# Patient Record
Sex: MALE | Race: WHITE | HISPANIC OR LATINO | Employment: PART TIME | URBAN - METROPOLITAN AREA
[De-identification: names, ages, dates, MRNs, and addresses within clinical notes are randomized per-mention and may not be internally consistent; named-entity substitution may affect disease eponyms.]

---

## 2019-11-13 ENCOUNTER — OFFICE VISIT (OUTPATIENT)
Dept: FAMILY MEDICINE | Facility: CLINIC | Age: 54
End: 2019-11-13
Payer: COMMERCIAL

## 2019-11-13 VITALS
HEIGHT: 67 IN | RESPIRATION RATE: 18 BRPM | SYSTOLIC BLOOD PRESSURE: 126 MMHG | TEMPERATURE: 98.6 F | OXYGEN SATURATION: 97 % | WEIGHT: 170 LBS | BODY MASS INDEX: 26.68 KG/M2 | DIASTOLIC BLOOD PRESSURE: 68 MMHG | HEART RATE: 57 BPM

## 2019-11-13 DIAGNOSIS — I10 BENIGN ESSENTIAL HYPERTENSION: ICD-10-CM

## 2019-11-13 DIAGNOSIS — R73.03 PRE-DIABETES: ICD-10-CM

## 2019-11-13 DIAGNOSIS — I63.9 ACUTE ISCHEMIC STROKE (H): Primary | ICD-10-CM

## 2019-11-13 DIAGNOSIS — E78.5 HYPERLIPIDEMIA LDL GOAL <100: ICD-10-CM

## 2019-11-13 DIAGNOSIS — F43.21 SITUATIONAL DEPRESSION: ICD-10-CM

## 2019-11-13 LAB
ANION GAP SERPL CALCULATED.3IONS-SCNC: 3 MMOL/L (ref 3–14)
BUN SERPL-MCNC: 14 MG/DL (ref 7–30)
CALCIUM SERPL-MCNC: 9.1 MG/DL (ref 8.5–10.1)
CHLORIDE SERPL-SCNC: 105 MMOL/L (ref 94–109)
CO2 SERPL-SCNC: 30 MMOL/L (ref 20–32)
CREAT SERPL-MCNC: 0.77 MG/DL (ref 0.66–1.25)
GFR SERPL CREATININE-BSD FRML MDRD: >90 ML/MIN/{1.73_M2}
GLUCOSE SERPL-MCNC: 117 MG/DL (ref 70–99)
POTASSIUM SERPL-SCNC: 3.8 MMOL/L (ref 3.4–5.3)
SODIUM SERPL-SCNC: 138 MMOL/L (ref 133–144)

## 2019-11-13 PROCEDURE — 80048 BASIC METABOLIC PNL TOTAL CA: CPT | Performed by: FAMILY MEDICINE

## 2019-11-13 PROCEDURE — 99203 OFFICE O/P NEW LOW 30 MIN: CPT | Performed by: FAMILY MEDICINE

## 2019-11-13 PROCEDURE — 36415 COLL VENOUS BLD VENIPUNCTURE: CPT | Performed by: FAMILY MEDICINE

## 2019-11-13 RX ORDER — ASPIRIN 81 MG/1
81 TABLET ORAL
COMMUNITY
Start: 2019-11-08 | End: 2019-12-02

## 2019-11-13 RX ORDER — ATORVASTATIN CALCIUM 40 MG/1
40 TABLET, FILM COATED ORAL
COMMUNITY
Start: 2019-11-08 | End: 2019-12-05

## 2019-11-13 RX ORDER — LISINOPRIL 2.5 MG/1
2.5 TABLET ORAL
COMMUNITY
Start: 2019-11-09 | End: 2019-12-02

## 2019-11-13 RX ORDER — CITALOPRAM HYDROBROMIDE 10 MG/1
10 TABLET ORAL
COMMUNITY
Start: 2019-11-08 | End: 2019-12-02

## 2019-11-13 RX ORDER — CLOPIDOGREL BISULFATE 75 MG/1
75 TABLET ORAL
COMMUNITY
Start: 2019-11-08 | End: 2019-12-05

## 2019-11-13 RX ORDER — AMOXICILLIN 250 MG
1-2 CAPSULE ORAL
COMMUNITY
Start: 2019-11-08 | End: 2023-07-18

## 2019-11-13 RX ORDER — ACETAMINOPHEN 325 MG/1
325-650 TABLET ORAL
COMMUNITY
Start: 2019-11-08 | End: 2023-07-18

## 2019-11-13 ASSESSMENT — ANXIETY QUESTIONNAIRES
3. WORRYING TOO MUCH ABOUT DIFFERENT THINGS: NOT AT ALL
5. BEING SO RESTLESS THAT IT IS HARD TO SIT STILL: NOT AT ALL
IF YOU CHECKED OFF ANY PROBLEMS ON THIS QUESTIONNAIRE, HOW DIFFICULT HAVE THESE PROBLEMS MADE IT FOR YOU TO DO YOUR WORK, TAKE CARE OF THINGS AT HOME, OR GET ALONG WITH OTHER PEOPLE: NOT DIFFICULT AT ALL
GAD7 TOTAL SCORE: 0
2. NOT BEING ABLE TO STOP OR CONTROL WORRYING: NOT AT ALL
6. BECOMING EASILY ANNOYED OR IRRITABLE: NOT AT ALL
1. FEELING NERVOUS, ANXIOUS, OR ON EDGE: NOT AT ALL
7. FEELING AFRAID AS IF SOMETHING AWFUL MIGHT HAPPEN: NOT AT ALL

## 2019-11-13 ASSESSMENT — PATIENT HEALTH QUESTIONNAIRE - PHQ9
SUM OF ALL RESPONSES TO PHQ QUESTIONS 1-9: 0
5. POOR APPETITE OR OVEREATING: NOT AT ALL

## 2019-11-13 ASSESSMENT — MIFFLIN-ST. JEOR: SCORE: 1551.8

## 2019-11-13 NOTE — PROGRESS NOTES
Subjective    Rolando Granados is a 54 year old male who presents to clinic with his daughter today for the following health issues:     translated the appointment.   New Patient/Transfer of Care from [facility]   Hospital Follow-up Visit:    Hospital/Nursing Home/ Rehab Facility: [facility]  Date of Admission: 10/30/19  Date of Discharge: 11/1/19  Reason(s) for Admission: Acute ischemic stroke            Problems taking medications regularly:  None       Medication changes since discharge:     Hospital Discharge Note  START taking these medicines - Medication regimen changes: started on ASA, Plavix, Lipitor for ischemic stroke. Celexa for situational depression.    acetaminophen 325 mg tablet  For diagnoses: Pain  Commonly known as: TYLENOL  Take 2 tablets by mouth every 4 hours if needed (for mild pain or temperature greater than 99.5 F (37.5 C)). Max acetaminophen dose: 4000mg in 24 hrs.    aspirin 81 mg enteric coated tablet  For diagnoses: Acute ischemic stroke (HC)  Commonly known as: ECOTRIN  Take 1 tablet by mouth once daily with a meal.    atorvastatin 40 mg tablet  For diagnoses: Acute ischemic stroke (HC)  Commonly known as: LIPITOR  Take 1 tablet by mouth at bedtime.    citalopram 10 mg tablet  For diagnoses: Situational depression  Start taking on: November 2, 2019  Commonly known as: CELEXA  Take 1 tablet by mouth every morning.    clopidogrel 75 mg tablet  For diagnoses: Acute ischemic stroke (HC)  Commonly known as: PLAVIX  Take 1 tablet by mouth every morning.      STOP taking these medicines - Patient says he is not aware of this   ibuprofen 200 mg tablet  Commonly known as: ADVIL; MOTRIN            Problems adhering to non-medication therapy:  No problems     Summary of hospitalization:  CareEverywhere information obtained and reviewed  Diagnostic Tests/Treatments reviewed.  Follow up needed: 2- 3 months recheck, fasting labs  Other Healthcare  "Providers Involved in Patient s Care:         Physical Therapy  Update since discharge: improved.     Post Discharge Medication Reconciliation: discharge medications reconciled, continue medications without change.  Plan of care communicated with patient, family and       Coding guidelines for this visit:  Type of Medical   Decision Making Face-to-Face Visit       within 7 Days of discharge Face-to-Face Visit        within 14 days of discharge   Moderate Complexity [id number]   High Complexity [id number]          Patient is new to our office. He is here with a  and his daughter. He reports he was previously healthy until this hospitalization.     -Pt had a stroke on 10/30/19. At about 10 am that day he felt pain in his left fingers, around 1:30 pm he felt pain in his legs and it was difficult to walk. He started to experience weakness in his left leg and had blurry/double vision. He went to the ER and was admitted with left hemiglegia, dysconjugate gaze, dysarthria and HTN. He received tpa on 10/30/19 for small acute infarction within the parasagittal right hemipons. He was d/c'd to rehab on 11/1 and then d/c'd on 11/9/19 with outpt PHYSICAL THERAPY, OT.     Since discharge he reports he is tolerating the medications well, feels better every day. Appetite is back to normal.     -He continues to experience some weakness in his left leg, but it has improved. He has appointments with physical therapy already scheduled. Hospital staff told him to avoid going up and down stairs so he has moved in with his daughter living in [place] because he lives on the 3rd floor of his apartment building.     -Has a little trouble with peripheral vision in his left eye. Looking to the left causes his eye to feel a little \"stretched\". Diplopia has improved.     Mental Health:  -When he was in the hospital his mental health was down. As his symptoms improve he feels a little better, but still feels " down as he is limited in some of his work due to his left leg weakness.     -Pt has follow-up appointments scheduled with neurology and neurophthalmology.   -Is interested in a nutritionist referral once he becomes more independent, currently his daughter has to take him to all his appointments.   -Pt has quit smoking since his stroke happened. He was smoking about 5-6 cigarettes per day for about 20 years.     -Used to go to [facility], was told he has high cholesterol.   -Denies constipation, diarrhea, breathing issues, cp or pressure    10/30/19 Hospital Admission  [facility]   ANW HOSPITALIST SERVICE  DISCHARGE SUMMARY    Date of service: 2019   _____________________________________________      PATIENT NAME: Rolando Granados   AGE/SEX: 53 y.o. male   : 1965   MRN: [id number]   -----------------------------------  PRIMARY CARE PROVIDER: No Pcp   PCP PHONE: None  -----------------------------------  ADMISSION DATE: 10/30/2019  DISCHARGE DATE: 2019     Dear No Pcp    It was a pleasure taking care of Rolando Granados during this hospitalization. He will be discharged from [facility] to [facility].     PRINCIPAL DIAGNOSIS CAUSING ADMISSION     Acute ischemic stroke     PATIENT'S ACTIVE HOSPITAL PROBLEM LIST   Principal Problem:  Acute ischemic stroke (HC)  Active Problems:  Aphasia due to acute stroke (HC)  Left hemiplegia (HC)  Dysconjugate gaze  HTN (hypertension)  Situational depression  [facility] diagnosis specificity reference link    BRIEF HOSPITAL COURSE   Rolando Granados is a 53 y.o. male with a history of hypertension and hyperlipidemia who was admitted on 10/30/2019 after presenting to the ED for evaluation of a sudden onset of a headache, dizziness,  left arm numbness/weakness, unsteady gait and left eye vision change.     In the ED, hypertensive up to 212/128, otherwise VSS. Labs unremarkable. Stat head CT was without acute findings.  Stroke neurologist (Dr. Benitez) agreed with initiating nicardipine for his elevated blood pressure and then tPA was administered. Admitted to [facility] ICU for further evaluation and treatment.      CTA head/neck/carotid showed no acute abnormalities. Head/Brain MRI showed a small acute infarction within the parasagittal right hemipons. TTE showed EF of 67% and borderline LV wall thickness.      PM&R consulted. SLP consulted and recommended regular, thin liquid diet. Stable to transfer to neuro floor on 10/31 with IM assuming cares. Started on atorvastatin, ASA, and Plavix on 11/1. Patient accepted at [facility] and to be discharged there on 11/1.    Patient reports feeling very down, agreed with SSRI, started on Celexa 10 mg    Patient will be discharged to [facility] on 11/1/2019 with instructions to follow up with their PCP in 3-5 days. During this hospitalization, patient was started on acetaminophen 650 mg Q4H PRN, aspirin 81 mg QD, atorvastatin 40 mg QHS, citalopram 10 mg QAM, and Plavix 75 mg QAM. His ibuprofen was discontinued. IM will continue following at [facility]    COMPLICATIONS DURING HOSPITALIZATION     None    CONSULTATIONS REQUESTED DURING THIS ADMISSION     Neurology - Reji Benitez MD  PM&R - Tawanna Kaye,     PERTINENT IMAGING RESULTS   TTE 10/31/19  1. Normal left ventricular size, borderline wall thickness, normal global systolic function, calculated EF of 67 %.   2. Normal cardiac valves.     MR Head/Brain 10/30/19  1. Small acute infarction within the parasagittal right hemipons.     CTA Head/Neck/Carotid 10/30/19  1. No large vessel occlusion. No acute intracranial abnormality at CT/CTA. No carotid or vertebral artery stenosis or dissection.  2. Intracranial atherosclerotic disease with a focal moderate stenosis of the right M1 segment.      CT Head Stroke Protocol 10/30/19  Normal noncontrast CT of the head for the patient`s age.  Nothing seen to correlate with history of headache.     EKG 10/30/2019  HR: 52  "bpm  Sinus bradycardia  Otherwise normal ECG  No previous ECGs available    PROCEDURES PERFORMED DURING THIS ADMISSION     None    PERTINENT FINDINGS / LABS AT DISCHARGE     /85  Pulse 67  Temp 98.7  F (37.1  C)  Resp 16  Ht 1.676 m (5' 6\")  Wt 77.1 kg (170 lb)  SpO2 97%  BMI 27.44 kg/m      ADDITIONAL VITAL SIGNS: tired, sleeping but wakes up to verbal stimuli; alert and oriented#3  CHEST WALL: Normal   RESPIRATORY: clear to auscultation  CARDIOVASCULAR: S1 S2 regular, no additional sounds or murmurs. No pedal edema. Good peripheral pulses.   GASTROINTESTINAL: Soft, non-tender, active bowel sounds   MUSCULOSKELETAL: No pedal edema.   SKIN/HAIR/NAILS: No rash   NEUROLOGIC: A&Ox3, mild left facial droop and left hemiparesis, left eye outward deviation-improved.   PSYCHIATRIC: Appropriate affect      Recent Labs   10/31/19  0452 10/30/19  1748   WBC 6.6 7.0   RBC 4.68 5.02   HGB 14.4 15.6   HCT 42.6 45.1   MCV 91 90   MCH 30.8 31.1   MCHC 33.8 34.6    280   MPV 9.9 10.4     Recent Labs   10/30/19  1749   SODIUM 141   POTASSIUM 4.0   CHLORIDE 106   IZ4YVMZO 26   ANIONGAP 9   BUN 16   CREATININE 1.10   GLUCOSE 113 H   CALCIUM 9.7   GFRAFRICAN >60   GFRNOTAFRICA >60     Recent Labs   10/31/19  0452 10/30/19  1749 10/30/19  1748   INR 1.0 1.0 --    -- 280   PTT 27 -- --     Recent Labs   10/31/19  0452   CHOL 213 H   TRIGLYCERIDE 92   HDL 54   CHOLHDLRATIO 3.94   LDLCHOL 141 H     Recent Labs   10/31/19  1552   PCPQUAL Not Detected   BENZODIAZQUL Not Detected   COCAINEQUAL Not Detected   THCQUAL Not Detected   OPITESQUAL Not Detected   BARBITURQUAL Not Detected   TRICYCQUAL Not Detected   METHADONEQLT Not Detected     Recent Labs   11/01/19  0854 10/31/19  2053 10/31/19  1715 10/31/19  1121 10/31/19  0451 10/30/19  2255 10/30/19  1738   GLUCOSEMETER 92 93 154 H 94 80 97 102 H     IMPORTANT PENDING TEST RESULTS     None    DISCHARGE MEDICATIONS / ORDERS       Your Home Medicines     START taking " these medicines   Instructions   acetaminophen 325 mg tablet  For diagnoses: Pain  Commonly known as: TYLENOL  Take 2 tablets by mouth every 4 hours if needed (for mild pain or temperature greater than 99.5 F (37.5 C)). Max acetaminophen dose: 4000mg in 24 hrs.    aspirin 81 mg enteric coated tablet  For diagnoses: Acute ischemic stroke (HC)  Commonly known as: ECOTRIN  Take 1 tablet by mouth once daily with a meal.    atorvastatin 40 mg tablet  For diagnoses: Acute ischemic stroke (HC)  Commonly known as: LIPITOR  Take 1 tablet by mouth at bedtime.    citalopram 10 mg tablet  For diagnoses: Situational depression  Start taking on: November 2, 2019  Commonly known as: CELEXA  Take 1 tablet by mouth every morning.    clopidogrel 75 mg tablet  For diagnoses: Acute ischemic stroke (HC)  Commonly known as: PLAVIX  Take 1 tablet by mouth every morning.      STOP taking these medicines   ibuprofen 200 mg tablet  Commonly known as: ADVIL; MOTRIN           Patient Active Problem List   Diagnosis     Acute ischemic stroke (H)     History reviewed. No pertinent surgical history.    Social History     Tobacco Use     Smoking status: Former Smoker     Smokeless tobacco: Never Used     Tobacco comment: 5-6 cig per day for 20 years. quit 2019.    Substance Use Topics     Alcohol use: Not Currently     Family History   Problem Relation Age of Onset     Other - See Comments Sister         pre-diabetes             Reviewed and updated as needed this visit by Provider         Review of Systems   ROS COMP: Constitutional, HEENT, cardiovascular, pulmonary, gi and gu systems are negative, except as otherwise noted.    This document serves as a record of the services and decisions personally performed by    WERMERSKIRCHEN, PADMINI NANCY  [employer]. It was created on his/her behalf by Doyr Vaz, a trained medical scribe. The creation of this document is based on the provider's statements to the medical scribe.  "Dory Vaz, November 13, 2019 1:15 PM      Objective    /68 (BP Location: Right arm, Patient Position: Sitting, Cuff Size: Adult Large)   Pulse 57   Temp 98.6  F (37  C) (Oral)   Resp 18   Ht 1.689 m (5' 6.5\")   Wt 77.1 kg (170 lb)   SpO2 97%   BMI 27.03 kg/m    Body mass index is 27.03 kg/m .  Physical Exam   GENERAL: healthy, alert and no distress  EYES: Eyes grossly normal to inspection, PERRL and conjunctivae and sclerae normal  NECK: no adenopathy, no asymmetry, masses, or scars and thyroid normal to palpation  RESP: lungs clear to auscultation - no rales, rhonchi or wheezes  CV: regular rate and rhythm, normal S1 S2, no S3 or S4, no murmur, click or rub, no peripheral edema and peripheral pulses strong  MS: no gross musculoskeletal defects noted, no edema  NEURO: 5/5 strength and tone in UEs and LEs,  mentation appropriate for questions asked and speech, very mild left facial droop no involving forehead  PSYCH: mentation appears normal, affect normal/bright    Diagnostic Test Results:  Labs reviewed in Epic        Assessment & Plan       ICD-10-CM    1. Acute ischemic stroke (H) I63.9 Basic metabolic panel   2. Situational depression F43.21    3. Hyperlipidemia LDL goal <100 E78.5    4. Pre-diabetes R73.03      Continue all medications as prescribed from the hospital. Pt is to follow through with scheduled physical therapy, neurology, and neurophthalmology visits. Reviewed red flag symptoms that would precipitate the need for routine, urgent or emergent f/u. Follow-up with me in 2-3 months for visit and fasting labs. Consider nutrition referral next visit. He will contact us when refills are needed.      BMI:   Estimated body mass index is 27.03 kg/m  as calculated from the following:    Height as of this encounter: 1.689 m (5' 6.5\").    Weight as of this encounter: 77.1 kg (170 lb).   Weight management plan: not addressed at this visit        Patient Instructions     Follow-up in 2 months for " a visit and fasting labs. Be sure you are fasting for 10-12 hours before your appointment. It is okay to take your medications with water.     If you feel stroke symptoms immediately go to the emergency room, such as new, abrupt weakness on one side of the body, changes in speech or ability to understand speech, changes in vision, dizziness.     As you feel better, eat healthy - more fruits and veggies, less saturated fats and red meats (pork), avoid lots of carbohydrates like rice/tortillas. Try to stick to lean proteins, such as chicken, fish. When you want a referral to a nutritionist let me know.       At [facility], we strive to deliver an exceptional experience to you, every time we see you. If you receive a survey, please complete it as we do value your feedback.  If you have MyChart, you can expect to receive results automatically within 24 hours of their completion.  Your provider will send a note interpreting your results as well.   If you do not have MyChart, you should receive your results in about a week by mail.    Your care team:     Family Medicine   MAURICE Talbot MD Emily Bunt, APRN CNP   S. MD Meliza Lemos MD Angela Wermerskirchen, MD    Internal Medicine  Brian Uribe MD coming 2020     Clinic hours: Monday - Wednesday 7 am-7 pm   Thursdays and Fridays 7 am-5 pm.     [facility]: Monday - Friday 11 am-9 pm,   Saturday and Sunday 9 am-5 pm.    [facility]: Monday -Thursday 8 am-8 pm; Friday 8 am-6 pm; Saturday and Sunday 9 am-5 pm.     [facility]: Monday - Thursday 8 am - 7 pm; Friday 8 am - 6 pm    Clinic: [phone]   [facility]: [phone]   [facility]: [phone]                Return in about 2 months (around 1/13/2020) for Recheck, Fasting Lab Work.  Length of visit was 37 minutes with more than 50  percent of that time used for discussing medical concerns and education    The information in this document, created by the medical scribe for me, accurately reflects the services I personally performed and the decisions made by me. I have reviewed and approved this document for accuracy.   Nancie Augustin MD  [facility]

## 2019-11-13 NOTE — PATIENT INSTRUCTIONS
Follow-up in 2 months for a visit and fasting labs. Be sure you are fasting for 10-12 hours before your appointment. It is okay to take your medications with water.     If you feel stroke symptoms immediately go to the emergency room, such as new, abrupt weakness on one side of the body, changes in speech or ability to understand speech, changes in vision, dizziness.     As you feel better, eat healthy - more fruits and veggies, less saturated fats and red meats (pork), avoid lots of carbohydrates like rice/tortillas. Try to stick to lean proteins, such as chicken, fish. When you want a referral to a nutritionist let me know.       At [facility], we strive to deliver an exceptional experience to you, every time we see you. If you receive a survey, please complete it as we do value your feedback.  If you have MyChart, you can expect to receive results automatically within 24 hours of their completion.  Your provider will send a note interpreting your results as well.   If you do not have MyChart, you should receive your results in about a week by mail.    Your care team:     Family Medicine   MAURICE Talbot MD Emily Bunt, ELLY [facility]   S. MD Meliza Lemos MD Angela Wermerskirchen, MD    Internal Medicine  Brian Uribe MD coming 2020     Clinic hours: Monday - Wednesday 7 am-7 pm   Thursdays and Fridays 7 am-5 pm.     [facility]: Monday - Friday 11 am-9 pm,   Saturday and Sunday 9 am-5 pm.    [facility]: Monday -Thursday 8 am-8 pm; Friday 8 am-6 pm; Saturday and Sunday 9 am-5 pm.     [facility]: Monday - Thursday 8 am - 7 pm; Friday 8 am - 6 pm    Clinic: [phone]   [facility]: [phone]   [facility]: [phone]

## 2019-11-13 NOTE — LETTER
November 13, 2019      Rolando Granados  [address] [address] [address]        Dear Rolando,     It was a pleasure seeing you at your recent visit. Your labs have been reviewed and are attached.     The kidney and electrolyte panel was normal. The glucose is normal for a non-fasting test. Please continue your current medications.           Sincerely,        Nancie Augustin MD      Results for orders placed or performed in visit on 11/13/19   Basic metabolic panel     Status: Abnormal   Result Value Ref Range    Sodium 138 133 - 144 mmol/L    Potassium 3.8 3.4 - 5.3 mmol/L    Chloride 105 94 - 109 mmol/L    Carbon Dioxide 30 20 - 32 mmol/L    Anion Gap 3 3 - 14 mmol/L    Glucose 117 (H) 70 - 99 mg/dL    Urea Nitrogen 14 7 - 30 mg/dL    Creatinine 0.77 0.66 - 1.25 mg/dL    GFR Estimate >90 >60 mL/min/[1.73_m2]    GFR Estimate If Black >90 >60 mL/min/[1.73_m2]    Calcium 9.1 8.5 - 10.1 mg/dL

## 2019-11-14 ASSESSMENT — ANXIETY QUESTIONNAIRES: GAD7 TOTAL SCORE: 0

## 2019-11-15 PROBLEM — I10 BENIGN ESSENTIAL HYPERTENSION: Status: ACTIVE | Noted: 2019-11-15

## 2019-11-15 PROBLEM — E78.5 HYPERLIPIDEMIA LDL GOAL <100: Status: ACTIVE | Noted: 2019-11-15

## 2019-11-15 PROBLEM — F43.21 SITUATIONAL DEPRESSION: Status: ACTIVE | Noted: 2019-11-15

## 2019-11-15 PROBLEM — R73.03 PRE-DIABETES: Status: ACTIVE | Noted: 2019-11-15

## 2019-12-02 DIAGNOSIS — F43.21 SITUATIONAL DEPRESSION: ICD-10-CM

## 2019-12-02 DIAGNOSIS — I10 BENIGN ESSENTIAL HYPERTENSION: ICD-10-CM

## 2019-12-02 DIAGNOSIS — I63.9 ACUTE ISCHEMIC STROKE (H): Primary | ICD-10-CM

## 2019-12-02 DIAGNOSIS — E78.5 HYPERLIPIDEMIA LDL GOAL <100: ICD-10-CM

## 2019-12-02 NOTE — TELEPHONE ENCOUNTER
Reason for Call:  Medication or medication refill:    Do you use a [facility]?  Name of the pharmacy and phone number for the current request:  [id number] [address] [facility] [address]    Name of the medication requested: aspirin 81 MG EC tablet, citalopram (CELEXA) 10 MG tablet,      Other request: clopidogrel (PLAVIX) 75 MG tablet, lisinopril (PRINIVIL/ZESTRIL) 2.5 MG tablet    Can we leave a detailed message on this number? YES    Phone number patient can be reached at: Cell number on file:    Telephone Information:   Mobile [phone]       Best Time: anytime    Call taken on 12/2/2019 at 3:04 PM by Junito Ocampo

## 2019-12-02 NOTE — TELEPHONE ENCOUNTER
"Requested Prescriptions   Pending Prescriptions Disp Refills     lisinopril (PRINIVIL/ZESTRIL) 2.5 MG tablet       Sig: Take 1 tablet (2.5 mg) by mouth       ACE Inhibitors (Including Combos) Protocol Passed - 12/2/2019  3:07 PM        Passed - Blood pressure under 140/90 in past 12 months     BP Readings from Last 3 Encounters:   11/13/19 126/68                 Passed - Recent (12 mo) or future (30 days) visit within the authorizing provider's specialty     Patient has had an office visit with the authorizing provider or a provider within the authorizing providers department within the previous 12 mos or has a future within next 30 days. See \"Patient Info\" tab in inbasket, or \"Choose Columns\" in Meds & Orders section of the refill encounter.              Passed - Medication is active on med list        Passed - Patient is age 18 or older        Passed - Normal serum creatinine on file in past 12 months     Recent Labs   Lab Test 11/13/19  1348   CR 0.77             Passed - Normal serum potassium on file in past 12 months     Recent Labs   Lab Test 11/13/19  1348   POTASSIUM 3.8             aspirin 81 MG EC tablet       Sig: Take 1 tablet (81 mg) by mouth       Analgesics (Non-Narcotic Tylenol and ASA Only) Passed - 12/2/2019  3:07 PM        Passed - Recent (12 mo) or future (30 days) visit within the authorizing provider's specialty     Patient has had an office visit with the authorizing provider or a provider within the authorizing providers department within the previous 12 mos or has a future within next 30 days. See \"Patient Info\" tab in inbasket, or \"Choose Columns\" in Meds & Orders section of the refill encounter.              Passed - Patient is age 20 years or older     If ASA is flagged for ages under 20 years old. Forward to provider for confirmation Ryes Syndrome is not a concern.              Passed - Medication is active on med list        citalopram (CELEXA) 10 MG tablet       Sig: Take 1 tablet (10 " "mg) by mouth       SSRIs Protocol Passed - 12/2/2019  3:07 PM        Passed - Recent (12 mo) or future (30 days) visit within the authorizing provider's specialty     Patient has had an office visit with the authorizing provider or a provider within the authorizing providers department within the previous 12 mos or has a future within next 30 days. See \"Patient Info\" tab in inbasket, or \"Choose Columns\" in Meds & Orders section of the refill encounter.              Passed - Medication is active on med list        Passed - Patient is age 18 or older        clopidogrel (PLAVIX) 75 MG tablet       Sig: Take 1 tablet (75 mg) by mouth       Plavix Failed - 12/2/2019  3:07 PM        Failed - Normal HGB on file in past 12 months     No lab results found.            Failed - Normal Platelets on file in past 12 months     No lab results found.            Passed - No active PPI on record unless is Protonix        Passed - Recent (12 mo) or future (30 days) visit within the authorizing provider's specialty     Patient has had an office visit with the authorizing provider or a provider within the authorizing providers department within the previous 12 mos or has a future within next 30 days. See \"Patient Info\" tab in inbasket, or \"Choose Columns\" in Meds & Orders section of the refill encounter.              Passed - Medication is active on med list        Passed - Patient is age 18 or older        lisinopril (PRINIVIL/ZESTRIL) 2.5 MG tablet  Last Written Prescription Date:  11/9/19  Last Fill Quantity: na,  # refills: na   Last office visit: 11/13/2019 with prescribing provider:  Dr. Augustin   Future Office Visit:   Next 5 appointments (look out 90 days)    Jan 06, 2020  8:20 AM CST  Office Visit with Nancie Augustin MD  [facility] ([facility]) [address] [place] [id number] [phone]           aspirin 81 MG EC tablet  Last Written Prescription Date:  " 11/8/19  Last Fill Quantity: na,  # refills: na   Last office visit: 11/13/2019 with prescribing provider:  Dr. Augustin   Future Office Visit:   Next 5 appointments (look out 90 days)    Jan 06, 2020  8:20 AM CST  [facility] [address] [place] [id number]  [phone]             citalopram (CELEXA) 10 MG tablet  Last Written Prescription Date:  11/8/19  Last Fill Quantity: na,  # refills: na   Last office visit: 11/13/2019 with prescribing provider:  Dr. Augustin   Future Office Visit:   Next 5 appointments (look out 90 days)    Jan 06, 2020  8:20 AM CST  Office Visit with Nancie Augustin MD  [facility] ([address] [address]  [phone]         PHQ-9 score:    PHQ-9 SCORE 11/13/2019   PHQ-9 Total Score 0             MERARY-7 SCORE 11/13/2019   Total Score 0           clopidogrel (PLAVIX) 75 MG tablet  Last Written Prescription Date:  11/8/19  Last Fill Quantity: na,  # refills: na   Last office visit: 11/13/2019 with prescribing provider:  Dr. Augustin   Future Office Visit:   Next 5 appointments (look out 90 days)    Jan 06, 2020  8:20 AM CST  Office Visit with Nancie Augustin MD  [facility] ([address] [address]  [phone]

## 2019-12-03 ENCOUNTER — TRANSFERRED RECORDS (OUTPATIENT)
Dept: HEALTH INFORMATION MANAGEMENT | Facility: CLINIC | Age: 54
End: 2019-12-03

## 2019-12-04 RX ORDER — CITALOPRAM HYDROBROMIDE 10 MG/1
10 TABLET ORAL DAILY
Qty: 90 TABLET | Refills: 1 | Status: SHIPPED | OUTPATIENT
Start: 2019-12-04 | End: 2020-04-16

## 2019-12-04 RX ORDER — LISINOPRIL 2.5 MG/1
2.5 TABLET ORAL DAILY
Qty: 90 TABLET | Refills: 1 | Status: SHIPPED | OUTPATIENT
Start: 2019-12-04 | End: 2020-01-07 | Stop reason: DRUGHIGH

## 2019-12-04 RX ORDER — CLOPIDOGREL BISULFATE 75 MG/1
75 TABLET ORAL
OUTPATIENT
Start: 2019-12-04

## 2019-12-04 RX ORDER — ASPIRIN 81 MG/1
81 TABLET ORAL DAILY
Qty: 90 TABLET | Refills: 3 | Status: SHIPPED | OUTPATIENT
Start: 2019-12-04 | End: 2020-12-16

## 2019-12-04 NOTE — TELEPHONE ENCOUNTER
Routing refill request to provider for review/approval because:  Medication is reported/historical    Kanwal Henriquez RN

## 2019-12-05 RX ORDER — ATORVASTATIN CALCIUM 80 MG/1
80 TABLET, FILM COATED ORAL AT BEDTIME
COMMUNITY
Start: 2019-12-03 | End: 2019-12-05

## 2019-12-05 RX ORDER — ATORVASTATIN CALCIUM 80 MG/1
80 TABLET, FILM COATED ORAL AT BEDTIME
Qty: 90 TABLET | Refills: 0 | Status: SHIPPED | OUTPATIENT
Start: 2019-12-05 | End: 2020-04-16

## 2019-12-05 NOTE — TELEPHONE ENCOUNTER
Per note with neurology, plavix treatment was only for one month and then plavix was to be stopped. Will decline refill. Please update pharmacy.     Per neurology OV note:  Secondary Stroke Prevention Recommendations:  -- Antiplatelet agent: dual antiplatelet therapy with Aspirin 81 mg and Plavix 75 mg x 30 days based on POINT and CHANCE trial results. Stop Plavix when out of pills and then continued on monotherapy with Aspirin 81 mg daily as life-long therapy

## 2019-12-05 NOTE — TELEPHONE ENCOUNTER
Pharmacy updated on d/c of Plavix    May have sent plavix request in error , patient needs Lipitor 80mg refilled, medication list updated.

## 2020-01-07 ENCOUNTER — OFFICE VISIT (OUTPATIENT)
Dept: FAMILY MEDICINE | Facility: CLINIC | Age: 55
End: 2020-01-07
Payer: COMMERCIAL

## 2020-01-07 VITALS
BODY MASS INDEX: 28.88 KG/M2 | DIASTOLIC BLOOD PRESSURE: 84 MMHG | WEIGHT: 184 LBS | HEART RATE: 55 BPM | TEMPERATURE: 98.1 F | OXYGEN SATURATION: 96 % | SYSTOLIC BLOOD PRESSURE: 159 MMHG | HEIGHT: 67 IN | RESPIRATION RATE: 18 BRPM

## 2020-01-07 DIAGNOSIS — Z13.1 SCREENING FOR DIABETES MELLITUS: ICD-10-CM

## 2020-01-07 DIAGNOSIS — E78.5 HYPERLIPIDEMIA LDL GOAL <100: ICD-10-CM

## 2020-01-07 DIAGNOSIS — R73.03 PRE-DIABETES: ICD-10-CM

## 2020-01-07 DIAGNOSIS — I63.9 ACUTE ISCHEMIC STROKE (H): Primary | ICD-10-CM

## 2020-01-07 DIAGNOSIS — I10 BENIGN ESSENTIAL HYPERTENSION: ICD-10-CM

## 2020-01-07 DIAGNOSIS — Z12.11 SCREEN FOR COLON CANCER: ICD-10-CM

## 2020-01-07 PROCEDURE — 99214 OFFICE O/P EST MOD 30 MIN: CPT | Mod: 25 | Performed by: FAMILY MEDICINE

## 2020-01-07 PROCEDURE — 90472 IMMUNIZATION ADMIN EACH ADD: CPT | Performed by: FAMILY MEDICINE

## 2020-01-07 PROCEDURE — 90732 PPSV23 VACC 2 YRS+ SUBQ/IM: CPT | Performed by: FAMILY MEDICINE

## 2020-01-07 PROCEDURE — 90471 IMMUNIZATION ADMIN: CPT | Performed by: FAMILY MEDICINE

## 2020-01-07 PROCEDURE — 90750 HZV VACC RECOMBINANT IM: CPT | Performed by: FAMILY MEDICINE

## 2020-01-07 RX ORDER — LISINOPRIL 10 MG/1
10 TABLET ORAL DAILY
Qty: 30 TABLET | Refills: 0 | Status: SHIPPED | OUTPATIENT
Start: 2020-01-07 | End: 2020-02-20

## 2020-01-07 RX ORDER — CLOPIDOGREL BISULFATE 75 MG/1
75 TABLET ORAL DAILY
Refills: 0 | COMMUNITY
Start: 2019-11-08 | End: 2020-01-07

## 2020-01-07 ASSESSMENT — MIFFLIN-ST. JEOR: SCORE: 1615.31

## 2020-01-07 ASSESSMENT — PAIN SCALES - GENERAL: PAINLEVEL: NO PAIN (0)

## 2020-01-07 NOTE — PATIENT INSTRUCTIONS
Increase lisinopril to 10 mg per day. I will send in a prescription for a higher dose so you will only need to take 1 pill at a time. I want to monitor your kidney function with this change, so come in for a lab visit and to recheck your blood pressure in 2-3 weeks. You will not need to see me for this, you can schedule with the lab for blood work and a medical assistant to check your blood pressure.     If your headaches persist, consider getting a different pillow to see if that helps.    If you prefer to get lab results in the mail, deactivate you MyChart so results aren't automatically sent there.     Bring back your stool sample for colon cancer screening when you come back for your lab work. A kit will be sent home with you today to collect this.     Schedule an appointment in 3-6 months with me for a physical.

## 2020-01-07 NOTE — NURSING NOTE
Prior to immunization administration, verified patients identity using patient s name and date of birth. Please see Immunization Activity for additional information.     Screening Questionnaire for Adult Immunization    Are you sick today?   No   Do you have allergies to medications, food, a vaccine component or latex?   No   Have you ever had a serious reaction after receiving a vaccination?   No   Do you have a long-term health problem with heart, lung, kidney, or metabolic disease (e.g., diabetes), asthma, a blood disorder, no spleen, complement component deficiency, a cochlear implant, or a spinal fluid leak?  Are you on long-term aspirin therapy?   No, pre-diabetic    Do you have cancer, leukemia, HIV/AIDS, or any other immune system problem?   No   Do you have a parent, brother, or sister with an immune system problem?   No   In the past 3 months, have you taken medications that affect  your immune system, such as prednisone, other steroids, or anticancer drugs; drugs for the treatment of rheumatoid arthritis, Crohn s disease, or psoriasis; or have you had radiation treatments?   No   Have you had a seizure, or a brain or other nervous system problem?   No   During the past year, have you received a transfusion of blood or blood    products, or been given immune (gamma) globulin or antiviral drug?   No   For women: Are you pregnant or is there a chance you could become       pregnant during the next month?   No   Have you received any vaccinations in the past 4 weeks?   No     Immunization questionnaire answers were all negative.     Patient instructed to remain in clinic for 15 minutes afterwards, and to report any adverse reaction to me immediately.       Screening performed by Antonieta Sánchez on 1/7/2020 at 9:38 AM.

## 2020-01-07 NOTE — PROGRESS NOTES
"Subjective     Rolando Granados is a 54 year old male who presents to clinic today for the following health issues:    Pt is with a  who translates the appointment.       HPI   Hospital Follow-up Visit:    Hospital/Nursing Home/IP Rehab Facility: [facility]  Date of Admission: 10/30/19  Date of Discharge: 11/1/19  Reason(s) for Admission: Stroke            Problems taking medications regularly:  None       Medication changes since discharge: updated       Problems adhering to non-medication therapy:  None    Summary of hospitalization: Reviewed at a previous visit on 11/13/2019  Diagnostic Tests/Treatments reviewed.  Follow up needed: PT and neurology    Other Healthcare Providers Involved in Patient s Care:         Specialist appointment - neurology  Update since discharge: improved.     Post Discharge Medication Reconciliation: discharge medications reconciled and changed, per note/orders (see AVS).  Plan of care communicated with patient     Coding guidelines for this visit:  Type of Medical   Decision Making Face-to-Face Visit       within 7 Days of discharge Face-to-Face Visit        within 14 days of discharge   Moderate Complexity [id number]   High Complexity [id number]        -Pt is feeling \"much better\" since his last appointment. He was in physical therapy but is done with that now.   -His speech has been good, pretty much back to normal. He feels that his left eye is a little droopy, but his vision is unaffected. He does experience some dryness/tears in his eyes when exposed to the sun. When he was in the hospital he had some eye drops, now just uses otc Visine eye drops that helps.     -Experiences still some mild weakness in his left leg. When he walks he feels like his left leg doesn't move quite the same way his right leg does, but it does not limit him from any activities. Denies feeling like he is going to fall when he walks. Denies numbness or tingling.   -Occasionally " "feels tightness in his calves at the end of the day  -His left fingers have been hurting since his stroke.     -He saw neurology on 12/03/2019, has follow-up appointment on 2/10/19  -Takes a daily baby aspirin. Stopped taking his plavix. Takes 80 mg atorvastatin daily. Denies muscle pain.  -Mood has been \"good\". Is on citalopram 10 mg daily.  -He plans to go back to work soon, his appointment on 01/22/2020 will help him determine if he can return. He feels good as if he were able to return to work at his current state. He is a .     Blood Pressure  -Pt brought a log of his blood pressures. SBP trended in the 130s originally, although recently the SBP has been increasing to the 140s and 150s. He has been taking lisinopril daily.      Headaches   -Last week he had two headaches that occured at the back of his head last week. Before his stroke his intermittent tension headaches were commonly in his temples. He took Tylenol that helped relieve the pain. He is unsure what triggered the headache. Headache is resolved now   -He drinks lots of water, eats regular meals, sleeps well.   -Denies feeling nauseous, emesis, changes is vision/hearing/balance, straining in his neck. The headache does not affect his stroke symptoms.   -He got new pillows about two weeks ago just prior to the onset of the headaches.     Weight   -Pt has gained 14 pounds since his last visit. Thinks that this could be related to inactivity since he isn't working. Notes that he has never weighed more than 170 pounds.   -He does participate in some PT exercises, but other than that isn't very active.   Wt Readings from Last 4 Encounters:   01/07/20 83.5 kg (184 lb)   11/13/19 77.1 kg (170 lb)       Patient Active Problem List   Diagnosis     Acute ischemic stroke (H)     Situational depression     Hyperlipidemia LDL goal <100     Pre-diabetes     Benign essential hypertension     Past Surgical History:   Procedure Laterality Date     NO HISTORY " "OF SURGERY         Social History     Tobacco Use     Smoking status: Former Smoker     Packs/day: 0.25     Years: 20.00     Pack years: 5.00     Last attempt to quit: 10/30/2019     Years since quittin.1     Smokeless tobacco: Never Used     Tobacco comment: 5-6 cig per day for 20 years. quit 2019.    Substance Use Topics     Alcohol use: Not Currently     Family History   Problem Relation Age of Onset     Other - See Comments Sister         pre-diabetes             Reviewed and updated as needed this visit by Provider         Review of Systems   ROS COMP: Constitutional, HEENT, cardiovascular, pulmonary, gi and gu systems are negative, except as otherwise noted.    This document serves as a record of the services and decisions personally performed by    WERMERSKIRCHEN, PADMINI NANCY  [employer]. It was created on his/her behalf by Dory Vaz, a trained medical scribe. The creation of this document is based on the provider's statements to the medical scribe. Dory Vaz, 2020 9:14 AM      Objective    BP (!) 159/84   Pulse 55   Temp 98.1  F (36.7  C) (Oral)   Resp 18   Ht 1.689 m (5' 6.5\")   Wt 83.5 kg (184 lb)   SpO2 96%   BMI 29.25 kg/m    Body mass index is 29.25 kg/m .  Physical Exam   GENERAL: healthy, alert and no distress  NECK: no adenopathy, no asymmetry, masses, or scars and thyroid normal to palpation  RESP: lungs clear to auscultation - no rales, rhonchi or wheezes  CV: regular rate and rhythm, normal S1 S2, no S3 or S4, no murmur, click or rub, no peripheral edema and peripheral pulses strong  ABDOMEN: soft, nontender, no hepatosplenomegaly, no masses and bowel sounds normal  MS: no gross musculoskeletal defects noted, no edema  SKIN: no suspicious lesions or rashes  NEURO: Normal strength and tone, mentation intact and speech normal, CN II-XII grossly intact except slight left eye/mouth droop, gait is normal, normal tiptoe, heel, and heel-toe " walking  PSYCH: mentation appears normal, affect normal/bright    Diagnostic Test Results:  Labs reviewed in Epic        Assessment & Plan     1. Acute ischemic stroke (H)  Overall much improved.   Patient is no longer in physical therapy and continues to do the exercises at home.   Monitor weight - ? If weight gain is due to inactivity vs citalopram AE.   ? If headache is caused by new pillow as they started occurring shortly after he switched pillows. Discussed trying a different pillow if headaches persist. Reviewed red flag symptoms that would precipitate the need for routine, urgent or emergent f/u   Pt has upcoming appointment on 01/22/2020 with rehab associates to see if he is ready to return to work.   Follow-up in 3-6 months for physical.   - lisinopril (PRINIVIL/ZESTRIL) 10 MG tablet; Take 1 tablet (10 mg) by mouth daily  Dispense: 30 tablet; Refill: 0    2. Benign essential hypertension  Blood pressure still elevated, likely due to recent increase in weight. Encouraged activity and wt loss.  Increase lisinopril to 10 mg daily. Reviewed timing of taking, onset, benefits, monitoring and typical and severe AE of the medication. Follow-up in 2-3 weeks for lab and bp recheck.   - **Comprehensive metabolic panel FUTURE anytime; Future  - lisinopril (PRINIVIL/ZESTRIL) 10 MG tablet; Take 1 tablet (10 mg) by mouth daily  Dispense: 30 tablet; Refill: 0    3. Hyperlipidemia LDL goal <100  Adjust therapy based on future labs  - **Comprehensive metabolic panel FUTURE anytime; Future  - Lipid panel reflex to direct LDL Fasting; Future  - CK total; Future    4. Pre-diabetes  Adjust therapy based on future labs. encouarged activity and weight loss for prevention of dm.  - **Comprehensive metabolic panel FUTURE anytime; Future  - **A1C FUTURE anytime; Future    5. Screening for diabetes mellitus  Adjust therapy based on future labs.    - **Comprehensive metabolic panel FUTURE anytime; Future    6. Screen for colon  cancer  - Fecal colorectal cancer screen (FIT); Future       Patient Instructions   Increase lisinopril to 10 mg per day. I will send in a prescription for a higher dose so you will only need to take 1 pill at a time. I want to monitor your kidney function with this change, so come in for a lab visit and to recheck your blood pressure in 2-3 weeks. You will not need to see me for this, you can schedule with the lab for blood work and a medical assistant to check your blood pressure.     If your headaches persist, consider getting a different pillow to see if that helps.    If you prefer to get lab results in the mail, deactivate you MyChart so results aren't automatically sent there.     Bring back your stool sample for colon cancer screening when you come back for your lab work. A kit will be sent home with you today to collect this.     Schedule an appointment in 3-6 months with me for a physical.       Return in about 2 weeks (around 1/21/2020) for Lab Work, BP Recheck. and in 3-6 months for cpe    Length of visit was 33 minutes with more than 50 percent of that time used for discussing medical concerns and education    The information in this document, created by the medical scribe for me, accurately reflects the services I personally performed and the decisions made by me. I have reviewed and approved this document for accuracy.   Nancie Augustin MD  [facility]

## 2020-01-24 ENCOUNTER — ALLIED HEALTH/NURSE VISIT (OUTPATIENT)
Dept: NURSING | Facility: CLINIC | Age: 55
End: 2020-01-24
Payer: COMMERCIAL

## 2020-01-24 VITALS — DIASTOLIC BLOOD PRESSURE: 83 MMHG | SYSTOLIC BLOOD PRESSURE: 137 MMHG

## 2020-01-24 DIAGNOSIS — R73.03 PRE-DIABETES: ICD-10-CM

## 2020-01-24 DIAGNOSIS — I10 BENIGN ESSENTIAL HYPERTENSION: ICD-10-CM

## 2020-01-24 DIAGNOSIS — I10 BENIGN ESSENTIAL HYPERTENSION: Primary | ICD-10-CM

## 2020-01-24 DIAGNOSIS — Z13.1 SCREENING FOR DIABETES MELLITUS: ICD-10-CM

## 2020-01-24 DIAGNOSIS — E78.5 HYPERLIPIDEMIA LDL GOAL <100: ICD-10-CM

## 2020-01-24 LAB
ALBUMIN SERPL-MCNC: 4.1 G/DL (ref 3.4–5)
ALP SERPL-CCNC: 84 U/L (ref 40–150)
ALT SERPL W P-5'-P-CCNC: 35 U/L (ref 0–70)
ANION GAP SERPL CALCULATED.3IONS-SCNC: 9 MMOL/L (ref 3–14)
AST SERPL W P-5'-P-CCNC: 14 U/L (ref 0–45)
BILIRUB SERPL-MCNC: 0.8 MG/DL (ref 0.2–1.3)
BUN SERPL-MCNC: 11 MG/DL (ref 7–30)
CALCIUM SERPL-MCNC: 8.8 MG/DL (ref 8.5–10.1)
CHLORIDE SERPL-SCNC: 103 MMOL/L (ref 94–109)
CHOLEST SERPL-MCNC: 136 MG/DL
CK SERPL-CCNC: 86 U/L (ref 30–300)
CO2 SERPL-SCNC: 26 MMOL/L (ref 20–32)
CREAT SERPL-MCNC: 0.79 MG/DL (ref 0.66–1.25)
GFR SERPL CREATININE-BSD FRML MDRD: >90 ML/MIN/{1.73_M2}
GLUCOSE SERPL-MCNC: 108 MG/DL (ref 70–99)
HBA1C MFR BLD: 6.2 % (ref 0–5.6)
HDLC SERPL-MCNC: 48 MG/DL
LDLC SERPL CALC-MCNC: 62 MG/DL
NONHDLC SERPL-MCNC: 88 MG/DL
POTASSIUM SERPL-SCNC: 3.9 MMOL/L (ref 3.4–5.3)
PROT SERPL-MCNC: 7.1 G/DL (ref 6.8–8.8)
SODIUM SERPL-SCNC: 138 MMOL/L (ref 133–144)
TRIGL SERPL-MCNC: 130 MG/DL

## 2020-01-24 PROCEDURE — 99207 ZZC NO CHARGE NURSE ONLY: CPT

## 2020-01-24 PROCEDURE — 80061 LIPID PANEL: CPT | Performed by: FAMILY MEDICINE

## 2020-01-24 PROCEDURE — 80053 COMPREHEN METABOLIC PANEL: CPT | Performed by: FAMILY MEDICINE

## 2020-01-24 PROCEDURE — 82550 ASSAY OF CK (CPK): CPT | Performed by: FAMILY MEDICINE

## 2020-01-24 PROCEDURE — 36415 COLL VENOUS BLD VENIPUNCTURE: CPT | Performed by: FAMILY MEDICINE

## 2020-01-24 PROCEDURE — 83036 HEMOGLOBIN GLYCOSYLATED A1C: CPT | Performed by: FAMILY MEDICINE

## 2020-01-24 NOTE — NURSING NOTE
Rolando Granados is a 54 year old patient who comes in today for a Blood Pressure check.  Initial BP:  BP (!) 152/80 (BP Location: Right arm, Patient Position: Sitting, Cuff Size: Adult Regular)      Data Unavailable  Disposition: results routed to provider    Lesli Rosa MA

## 2020-02-10 ENCOUNTER — TRANSFERRED RECORDS (OUTPATIENT)
Dept: HEALTH INFORMATION MANAGEMENT | Facility: CLINIC | Age: 55
End: 2020-02-10

## 2020-02-18 DIAGNOSIS — I63.9 ACUTE ISCHEMIC STROKE (H): ICD-10-CM

## 2020-02-18 DIAGNOSIS — I10 BENIGN ESSENTIAL HYPERTENSION: ICD-10-CM

## 2020-02-18 NOTE — TELEPHONE ENCOUNTER
"Requested Prescriptions   Pending Prescriptions Disp Refills     LISINOPRIL 10 MG PO tablet [Pharmacy Med Name: LISINOPRIL 10MG TABLETS] 30 tablet 0     Sig: TAKE 1 TABLET(10 MG) BY MOUTH DAILY       ACE Inhibitors (Including Combos) Protocol Passed - 2/18/2020  1:36 PM        Passed - Blood pressure under 140/90 in past 12 months     BP Readings from Last 3 Encounters:   01/24/20 137/83   01/07/20 (!) 159/84   11/13/19 126/68                 Passed - Recent (12 mo) or future (30 days) visit within the authorizing provider's specialty     Patient has had an office visit with the authorizing provider or a provider within the authorizing providers department within the previous 12 mos or has a future within next 30 days. See \"Patient Info\" tab in inbasket, or \"Choose Columns\" in Meds & Orders section of the refill encounter.              Passed - Medication is active on med list        Passed - Patient is age 18 or older        Passed - Normal serum creatinine on file in past 12 months     Recent Labs   Lab Test 01/24/20  0837   CR 0.79             Passed - Normal serum potassium on file in past 12 months     Recent Labs   Lab Test 01/24/20  0837   POTASSIUM 3.9               "

## 2020-02-20 RX ORDER — LISINOPRIL 10 MG/1
TABLET ORAL
Qty: 30 TABLET | Refills: 3 | Status: SHIPPED | OUTPATIENT
Start: 2020-02-20 | End: 2020-07-12

## 2020-02-20 NOTE — TELEPHONE ENCOUNTER
Prescription approved per [facility] Refill Protocol.      Kanwal Contreras RN  [facility]

## 2020-03-11 ENCOUNTER — HEALTH MAINTENANCE LETTER (OUTPATIENT)
Age: 55
End: 2020-03-11

## 2020-04-16 ENCOUNTER — MYC REFILL (OUTPATIENT)
Dept: FAMILY MEDICINE | Facility: CLINIC | Age: 55
End: 2020-04-16

## 2020-04-16 DIAGNOSIS — F43.21 SITUATIONAL DEPRESSION: ICD-10-CM

## 2020-04-16 DIAGNOSIS — I10 BENIGN ESSENTIAL HYPERTENSION: ICD-10-CM

## 2020-04-16 DIAGNOSIS — I63.9 ACUTE ISCHEMIC STROKE (H): ICD-10-CM

## 2020-04-16 DIAGNOSIS — E78.5 HYPERLIPIDEMIA LDL GOAL <100: ICD-10-CM

## 2020-04-16 RX ORDER — ASPIRIN 81 MG/1
81 TABLET ORAL DAILY
Qty: 90 TABLET | Refills: 3 | Status: CANCELLED | OUTPATIENT
Start: 2020-04-16

## 2020-04-16 RX ORDER — LISINOPRIL 10 MG/1
10 TABLET ORAL DAILY
Qty: 30 TABLET | Refills: 3 | Status: CANCELLED | OUTPATIENT
Start: 2020-04-16

## 2020-04-17 NOTE — TELEPHONE ENCOUNTER
"Requested Prescriptions   Pending Prescriptions Disp Refills     aspirin 81 MG EC tablet 90 tablet 3     Sig: Take 1 tablet (81 mg) by mouth daily       Analgesics (Non-Narcotic Tylenol and ASA Only) Passed - 4/16/2020 12:30 PM        Passed - Recent (12 mo) or future (30 days) visit within the authorizing provider's specialty     Patient has had an office visit with the authorizing provider or a provider within the authorizing providers department within the previous 12 mos or has a future within next 30 days. See \"Patient Info\" tab in inbasket, or \"Choose Columns\" in Meds & Orders section of the refill encounter.              Passed - Patient is age 20 years or older     If ASA is flagged for ages under 20 years old. Forward to provider for confirmation Ryes Syndrome is not a concern.              Passed - Medication is active on med list           citalopram (CELEXA) 10 MG tablet 90 tablet 1     Sig: Take 1 tablet (10 mg) by mouth daily       SSRIs Protocol Passed - 4/16/2020 12:30 PM        Passed - PHQ-9 score less than 5 in past 6 months     Please review last PHQ-9 score.           Passed - Medication is active on med list        Passed - Patient is age 18 or older        Passed - Recent (6 mo) or future (30 days) visit within the authorizing provider's specialty     Patient had office visit in the last 6 months or has a visit in the next 30 days with authorizing provider or within the authorizing provider's specialty.  See \"Patient Info\" tab in inbasket, or \"Choose Columns\" in Meds & Orders section of the refill encounter.               atorvastatin (LIPITOR) 80 MG tablet 90 tablet 0     Sig: Take 1 tablet (80 mg) by mouth At Bedtime       Statins Protocol Passed - 4/16/2020 12:30 PM        Passed - LDL on file in past 12 months     Recent Labs   Lab Test 01/24/20  0837   LDL 62             Passed - No abnormal creatine kinase in past 12 months     Recent Labs   Lab Test 01/24/20  0837   CKT 86               " " Passed - Recent (12 mo) or future (30 days) visit within the authorizing provider's specialty     Patient has had an office visit with the authorizing provider or a provider within the authorizing providers department within the previous 12 mos or has a future within next 30 days. See \"Patient Info\" tab in inbasket, or \"Choose Columns\" in Meds & Orders section of the refill encounter.              Passed - Medication is active on med list        Passed - Patient is age 18 or older           lisinopril (ZESTRIL) 10 MG tablet 30 tablet 3     Sig: Take 1 tablet (10 mg) by mouth daily       ACE Inhibitors (Including Combos) Protocol Passed - 4/16/2020 12:30 PM        Passed - Blood pressure under 140/90 in past 12 months     BP Readings from Last 3 Encounters:   01/24/20 137/83   01/07/20 (!) 159/84   11/13/19 126/68                 Passed - Recent (12 mo) or future (30 days) visit within the authorizing provider's specialty     Patient has had an office visit with the authorizing provider or a provider within the authorizing providers department within the previous 12 mos or has a future within next 30 days. See \"Patient Info\" tab in inbasket, or \"Choose Columns\" in Meds & Orders section of the refill encounter.              Passed - Medication is active on med list        Passed - Patient is age 18 or older        Passed - Normal serum creatinine on file in past 12 months     Recent Labs   Lab Test 01/24/20  0837   CR 0.79       Ok to refill medication if creatinine is low          Passed - Normal serum potassium on file in past 12 months     Recent Labs   Lab Test 01/24/20  0837   POTASSIUM 3.9                  "

## 2020-04-21 RX ORDER — ATORVASTATIN CALCIUM 80 MG/1
80 TABLET, FILM COATED ORAL AT BEDTIME
Qty: 90 TABLET | Refills: 2 | Status: SHIPPED | OUTPATIENT
Start: 2020-04-21 | End: 2021-08-26

## 2020-04-21 RX ORDER — CITALOPRAM HYDROBROMIDE 10 MG/1
10 TABLET ORAL DAILY
Qty: 30 TABLET | Refills: 0 | Status: SHIPPED | OUTPATIENT
Start: 2020-04-21 | End: 2020-06-08

## 2020-04-21 NOTE — TELEPHONE ENCOUNTER
Refills on file at pharmacy: Aspirin, Lisinopril     Prescription approved per [facility] [place] Protocol. - Atorvastatin, Celexa (30 days as PHQ-9 will be due)    Cyndee Jennings RN  [facility]

## 2020-06-05 ENCOUNTER — TELEPHONE (OUTPATIENT)
Dept: FAMILY MEDICINE | Facility: CLINIC | Age: 55
End: 2020-06-05

## 2020-06-05 DIAGNOSIS — F43.21 SITUATIONAL DEPRESSION: ICD-10-CM

## 2020-06-08 RX ORDER — CITALOPRAM HYDROBROMIDE 10 MG/1
10 TABLET ORAL DAILY
Qty: 30 TABLET | Refills: 0 | Status: SHIPPED | OUTPATIENT
Start: 2020-06-08 | End: 2020-07-12

## 2020-06-08 NOTE — TELEPHONE ENCOUNTER
Shawanda refill sent  Due for med check prior to further refills (virutal visit is ok)  Please send reminder (letter or mychart preferred, Panamanian speaking so need  if called)

## 2020-06-08 NOTE — TELEPHONE ENCOUNTER
Routing refill request to provider for review/approval because:  Labs not current:  PHQ-9  Patient overdue for blood pressure check

## 2020-07-12 ENCOUNTER — MYC REFILL (OUTPATIENT)
Dept: FAMILY MEDICINE | Facility: CLINIC | Age: 55
End: 2020-07-12

## 2020-07-12 DIAGNOSIS — E78.5 HYPERLIPIDEMIA LDL GOAL <100: ICD-10-CM

## 2020-07-12 DIAGNOSIS — F43.21 SITUATIONAL DEPRESSION: ICD-10-CM

## 2020-07-12 DIAGNOSIS — I10 BENIGN ESSENTIAL HYPERTENSION: ICD-10-CM

## 2020-07-12 DIAGNOSIS — I63.9 ACUTE ISCHEMIC STROKE (H): ICD-10-CM

## 2020-07-12 RX ORDER — ATORVASTATIN CALCIUM 80 MG/1
80 TABLET, FILM COATED ORAL AT BEDTIME
Qty: 90 TABLET | Refills: 2 | Status: CANCELLED | OUTPATIENT
Start: 2020-07-12

## 2020-07-12 RX ORDER — ASPIRIN 81 MG/1
81 TABLET ORAL DAILY
Qty: 90 TABLET | Refills: 3 | Status: CANCELLED | OUTPATIENT
Start: 2020-07-12

## 2020-07-14 DIAGNOSIS — F43.21 SITUATIONAL DEPRESSION: ICD-10-CM

## 2020-07-15 RX ORDER — LISINOPRIL 10 MG/1
10 TABLET ORAL DAILY
Qty: 30 TABLET | Refills: 0 | Status: SHIPPED | OUTPATIENT
Start: 2020-07-15 | End: 2020-07-27

## 2020-07-15 RX ORDER — CITALOPRAM HYDROBROMIDE 10 MG/1
10 TABLET ORAL DAILY
Qty: 30 TABLET | Refills: 0 | Status: SHIPPED | OUTPATIENT
Start: 2020-07-15 | End: 2020-08-18

## 2020-07-15 NOTE — TELEPHONE ENCOUNTER
Looks like patient read recommendation for visit on 7/13/2020.  Will refill one more time to give him time to schedule appointment

## 2020-07-15 NOTE — TELEPHONE ENCOUNTER
Routing refill request to provider for review/approval because:  Shawanda given x1 and patient did not follow up, please advise.    New MyChart noting the need to be seen for refills.    Rocio Styles RN, [facility] Triage

## 2020-07-16 RX ORDER — CITALOPRAM HYDROBROMIDE 10 MG/1
TABLET ORAL
Qty: 30 TABLET | Refills: 0 | OUTPATIENT
Start: 2020-07-16

## 2020-07-24 ENCOUNTER — VIRTUAL VISIT (OUTPATIENT)
Dept: FAMILY MEDICINE | Facility: CLINIC | Age: 55
End: 2020-07-24
Payer: COMMERCIAL

## 2020-07-24 VITALS — DIASTOLIC BLOOD PRESSURE: 80 MMHG | SYSTOLIC BLOOD PRESSURE: 142 MMHG

## 2020-07-24 DIAGNOSIS — F43.21 SITUATIONAL DEPRESSION: ICD-10-CM

## 2020-07-24 DIAGNOSIS — R73.03 PRE-DIABETES: ICD-10-CM

## 2020-07-24 DIAGNOSIS — E78.5 HYPERLIPIDEMIA LDL GOAL <100: ICD-10-CM

## 2020-07-24 DIAGNOSIS — I10 BENIGN ESSENTIAL HYPERTENSION: ICD-10-CM

## 2020-07-24 DIAGNOSIS — Z86.73 HISTORY OF ISCHEMIC STROKE: Primary | ICD-10-CM

## 2020-07-24 PROCEDURE — 99214 OFFICE O/P EST MOD 30 MIN: CPT | Mod: 95 | Performed by: FAMILY MEDICINE

## 2020-07-24 RX ORDER — LISINOPRIL 20 MG/1
20 TABLET ORAL DAILY
Qty: 90 TABLET | Refills: 1 | Status: SHIPPED | OUTPATIENT
Start: 2020-07-24 | End: 2020-12-01

## 2020-07-24 RX ORDER — LISINOPRIL 10 MG/1
10 TABLET ORAL DAILY
Qty: 30 TABLET | Refills: 0 | Status: CANCELLED | OUTPATIENT
Start: 2020-07-24

## 2020-07-24 RX ORDER — CITALOPRAM HYDROBROMIDE 10 MG/1
10 TABLET ORAL DAILY
Qty: 30 TABLET | Refills: 0 | Status: CANCELLED | OUTPATIENT
Start: 2020-07-24

## 2020-07-24 RX ORDER — ATORVASTATIN CALCIUM 80 MG/1
80 TABLET, FILM COATED ORAL AT BEDTIME
Qty: 90 TABLET | Refills: 2 | Status: CANCELLED | OUTPATIENT
Start: 2020-07-24

## 2020-07-24 NOTE — PROGRESS NOTES
"Rolando Granados is a 55 year old male who is being evaluated via a billable telephone visit.      The patient has been notified of following:     \"This telephone visit will be conducted via a call between you and your physician/provider. We have found that certain health care needs can be provided without the need for a physical exam.  This service lets us provide the care you need with a short phone conversation.  If a prescription is necessary we can send it directly to your pharmacy.  If lab work is needed we can place an order for that and you can then stop by our lab to have the test done at a later time.    Telephone visits are billed at different rates depending on your insurance coverage. During this emergency period, for some insurers they may be billed the same as an in-person visit.  Please reach out to your insurance provider with any questions.    If during the course of the call the physician/provider feels a telephone visit is not appropriate, you will not be charged for this service.\"    Patient has given verbal consent for Telephone visit?  Yes    What phone number would you like to be contacted at? [phone]     How would you like to obtain your AVS? Gunnar Jacob     Rolando Granados is a 55 year old male who presents via phone visit today for the following health issues:  Patient visit is completed today with the aid of a telephone .  HPI    Hyperlipidemia Follow-Up      Are you regularly taking any medication or supplement to lower your cholesterol?   Yes- atorvastatin     Are you having muscle aches or other side effects that you think could be caused by your cholesterol lowering medication?  No    Hypertension Follow-up       Do you check your blood pressure regularly outside of the clinic? No     Are you following a low salt diet? Yes    Are your blood pressures ever more than 140 on the top number (systolic) OR more   than 90 on the bottom number (diastolic), for example " 140/90? Does not monitor BP regularly but when he does check BP's it's usually in the 130's-140's/80's-90's. Last reading was taken last week and BP was 142/80      How many servings of fruits and vegetables do you eat daily?  2-3    On average, how many sweetened beverages do you drink each day (Examples: soda, juice, sweet tea, etc.  Do NOT count diet or artificially sweetened beverages)?   0    How many days per week do you exercise enough to make your heart beat faster? 4     How many minutes a day do you exercise enough to make your heart beat faster? 20 - 29    How many days per week do you miss taking your medication? 0      Medication Followup of citalopram     Taking Medication as prescribed: yes    Side Effects:  None    Medication Helping Symptoms:  yes      Headaches in the back of the head, but  in past but those have passed. Has had 3 x's since hospital stay. No headache now for 1 month.   No new dizziness, light-headedness, vision changes, hearing changes, weakness in arms or legs  Left leg still little slow from stroke    Patient is back at work and this is going well..     No recent illness, fever, chills, uri sx's.   Appetite and weight - weight up a bit. About 6 lbs. Eating healthier.   No bowel or bladder concerns except slower urination stream, nocturia sometimes 1 x' a night.    Psych: brice. Feeling good.              Patient Active Problem List   Diagnosis     Acute ischemic stroke (H)     Situational depression     Hyperlipidemia LDL goal <100     Pre-diabetes     Benign essential hypertension     Past Surgical History:   Procedure Laterality Date     NO HISTORY OF SURGERY         Social History     Tobacco Use     Smoking status: Former Smoker     Packs/day: 0.25     Years: 20.00     Pack years: 5.00     Last attempt to quit: 10/30/2019     Years since quittin.7     Smokeless tobacco: Never Used     Tobacco comment: 5-6 cig per day for 20 years. quit 2019.    Substance Use Topics      Alcohol use: Not Currently     Family History   Problem Relation Age of Onset     Other - See Comments Sister         pre-diabetes         Current Outpatient Medications   Medication Sig Dispense Refill     acetaminophen (TYLENOL) 325 MG tablet Take 325-650 mg by mouth       aspirin 81 MG EC tablet Take 1 tablet (81 mg) by mouth daily 90 tablet 3     atorvastatin (LIPITOR) 80 MG tablet Take 1 tablet (80 mg) by mouth At Bedtime 90 tablet 2     citalopram (CELEXA) 10 MG tablet Take 1 tablet (10 mg) by mouth daily Due for office visit prior to further refill 30 tablet 0     lisinopril (ZESTRIL) 10 MG tablet Take 1 tablet (10 mg) by mouth daily 30 tablet 0     senna-docusate (SENOKOT-S/PERICOLACE) 8.6-50 MG tablet Take 1-2 tablets by mouth       No Known Allergies    Reviewed and updated as needed this visit by Provider         Review of Systems   Constitutional, HEENT, cardiovascular, pulmonary, gi and gu systems are negative, except as otherwise noted.       Objective   Reported vitals:  BP (!) 142/80    healthy, alert and no distress  PSYCH: Alert and oriented times 3; coherent speech, normal   rate and volume, able to articulate logical thoughts, able   to abstract reason, no tangential thoughts, no hallucinations   or delusions  His affect is normal  RESP: No cough, no audible wheezing, able to talk in full sentences  Remainder of exam unable to be completed due to telephone visits    Diagnostic Test Results:  none         Assessment/Plan:    1. History of ischemic stroke  Overall doing quite well.  He is continuing on aspirin, statin and improved lifestyle measures.    2. Hyperlipidemia LDL goal <100  On statin    3. Situational depression  Has been much better.  His citalopram was started post stroke and I do wonder if he would be able to get off the citalopram now without issue.  He agrees and would like to trial off.  May restart if mood symptoms return.    4. Benign essential hypertension  Borderline control.   We discussed increasing lisinopril from 10 to 20 mg/day to improve blood pressure.  - lisinopril (ZESTRIL) 20 MG tablet; Take 1 tablet (20 mg) by mouth daily  Dispense: 90 tablet; Refill: 1    5. Pre-diabetes  He has been working on healthier diet and exercise to keep blood sugars down      Return in about 6 months (around 1/24/2021) for Medication check.      Phone call duration:  23 minutes    Nancie Augustin MD     Patient Instructions   Stop the citalopram medication. Be watchful for mood for 1-2 months after stopping the medication. If you feel depression or anxiety symptoms return, update the clinic and restart the citalopram    Schedule fasting lab only visit in the next few weeks.     Increase lisinopril dose to 20 mg daily for better blood pressure control    Schedule office visit this summer/fall to evaluate the decrease in urination

## 2020-08-18 ENCOUNTER — MYC REFILL (OUTPATIENT)
Dept: FAMILY MEDICINE | Facility: CLINIC | Age: 55
End: 2020-08-18

## 2020-08-18 DIAGNOSIS — F43.21 SITUATIONAL DEPRESSION: ICD-10-CM

## 2020-08-18 DIAGNOSIS — E78.5 HYPERLIPIDEMIA LDL GOAL <100: ICD-10-CM

## 2020-08-18 DIAGNOSIS — I10 BENIGN ESSENTIAL HYPERTENSION: ICD-10-CM

## 2020-08-18 DIAGNOSIS — I63.9 ACUTE ISCHEMIC STROKE (H): ICD-10-CM

## 2020-08-18 RX ORDER — ASPIRIN 81 MG/1
81 TABLET ORAL DAILY
Qty: 90 TABLET | Refills: 3 | Status: CANCELLED | OUTPATIENT
Start: 2020-08-18

## 2020-08-18 RX ORDER — LISINOPRIL 20 MG/1
20 TABLET ORAL DAILY
Qty: 90 TABLET | Refills: 1 | Status: CANCELLED | OUTPATIENT
Start: 2020-08-18

## 2020-08-18 RX ORDER — ATORVASTATIN CALCIUM 80 MG/1
80 TABLET, FILM COATED ORAL AT BEDTIME
Qty: 90 TABLET | Refills: 2 | Status: CANCELLED | OUTPATIENT
Start: 2020-08-18

## 2020-08-20 RX ORDER — CITALOPRAM HYDROBROMIDE 10 MG/1
10 TABLET ORAL DAILY
Qty: 90 TABLET | Refills: 1 | Status: SHIPPED | OUTPATIENT
Start: 2020-08-20 | End: 2021-07-16

## 2020-08-20 NOTE — TELEPHONE ENCOUNTER
Routing refill request to provider for review/approval because:  PHQ-9 fails protocol for RN to fill.    Rocio Styles RN, [facility] Triage

## 2020-12-01 ENCOUNTER — OFFICE VISIT (OUTPATIENT)
Dept: PEDIATRICS | Facility: CLINIC | Age: 55
End: 2020-12-01
Payer: COMMERCIAL

## 2020-12-01 VITALS
HEART RATE: 61 BPM | HEIGHT: 67 IN | WEIGHT: 187.5 LBS | TEMPERATURE: 98.7 F | DIASTOLIC BLOOD PRESSURE: 80 MMHG | SYSTOLIC BLOOD PRESSURE: 146 MMHG | OXYGEN SATURATION: 97 % | BODY MASS INDEX: 29.43 KG/M2

## 2020-12-01 DIAGNOSIS — E78.5 HYPERLIPIDEMIA LDL GOAL <100: ICD-10-CM

## 2020-12-01 DIAGNOSIS — Z12.11 SCREENING FOR COLON CANCER: ICD-10-CM

## 2020-12-01 DIAGNOSIS — Z86.73 HISTORY OF ISCHEMIC STROKE: ICD-10-CM

## 2020-12-01 DIAGNOSIS — I45.10 RBBB (RIGHT BUNDLE BRANCH BLOCK): ICD-10-CM

## 2020-12-01 DIAGNOSIS — R12 HEART BURN: ICD-10-CM

## 2020-12-01 DIAGNOSIS — R07.89 ATYPICAL CHEST PAIN: Primary | ICD-10-CM

## 2020-12-01 DIAGNOSIS — F43.21 SITUATIONAL DEPRESSION: ICD-10-CM

## 2020-12-01 DIAGNOSIS — R73.03 PRE-DIABETES: ICD-10-CM

## 2020-12-01 DIAGNOSIS — I10 BENIGN ESSENTIAL HYPERTENSION: ICD-10-CM

## 2020-12-01 DIAGNOSIS — Z23 NEED FOR PROPHYLACTIC VACCINATION AND INOCULATION AGAINST INFLUENZA: ICD-10-CM

## 2020-12-01 LAB
ANION GAP SERPL CALCULATED.3IONS-SCNC: 2 MMOL/L (ref 3–14)
BUN SERPL-MCNC: 13 MG/DL (ref 7–30)
CALCIUM SERPL-MCNC: 8.7 MG/DL (ref 8.5–10.1)
CHLORIDE SERPL-SCNC: 106 MMOL/L (ref 94–109)
CHOLEST SERPL-MCNC: 155 MG/DL
CO2 SERPL-SCNC: 31 MMOL/L (ref 20–32)
CREAT SERPL-MCNC: 0.81 MG/DL (ref 0.66–1.25)
GFR SERPL CREATININE-BSD FRML MDRD: >90 ML/MIN/{1.73_M2}
GLUCOSE SERPL-MCNC: 107 MG/DL (ref 70–99)
HBA1C MFR BLD: 6 % (ref 0–5.6)
HDLC SERPL-MCNC: 60 MG/DL
LDLC SERPL CALC-MCNC: 69 MG/DL
NONHDLC SERPL-MCNC: 95 MG/DL
POTASSIUM SERPL-SCNC: 4.2 MMOL/L (ref 3.4–5.3)
SODIUM SERPL-SCNC: 139 MMOL/L (ref 133–144)
TRIGL SERPL-MCNC: 131 MG/DL

## 2020-12-01 PROCEDURE — 99214 OFFICE O/P EST MOD 30 MIN: CPT | Mod: 25 | Performed by: INTERNAL MEDICINE

## 2020-12-01 PROCEDURE — 83036 HEMOGLOBIN GLYCOSYLATED A1C: CPT | Performed by: INTERNAL MEDICINE

## 2020-12-01 PROCEDURE — 93000 ELECTROCARDIOGRAM COMPLETE: CPT | Performed by: INTERNAL MEDICINE

## 2020-12-01 PROCEDURE — 90686 IIV4 VACC NO PRSV 0.5 ML IM: CPT | Performed by: INTERNAL MEDICINE

## 2020-12-01 PROCEDURE — T1013 SIGN LANG/ORAL INTERPRETER: HCPCS | Mod: U4 | Performed by: INTERNAL MEDICINE

## 2020-12-01 PROCEDURE — 80061 LIPID PANEL: CPT | Performed by: INTERNAL MEDICINE

## 2020-12-01 PROCEDURE — 36415 COLL VENOUS BLD VENIPUNCTURE: CPT | Performed by: INTERNAL MEDICINE

## 2020-12-01 PROCEDURE — 80048 BASIC METABOLIC PNL TOTAL CA: CPT | Performed by: INTERNAL MEDICINE

## 2020-12-01 PROCEDURE — 90471 IMMUNIZATION ADMIN: CPT | Performed by: INTERNAL MEDICINE

## 2020-12-01 RX ORDER — LISINOPRIL AND HYDROCHLOROTHIAZIDE 20; 25 MG/1; MG/1
1 TABLET ORAL DAILY
Qty: 90 TABLET | Refills: 0 | Status: SHIPPED | OUTPATIENT
Start: 2020-12-01 | End: 2021-01-26

## 2020-12-01 ASSESSMENT — MIFFLIN-ST. JEOR: SCORE: 1626.18

## 2020-12-01 NOTE — PROGRESS NOTES
Subjective     Rolando Granados is a 55 year old male who presents to clinic today for the following health issues:    HPI       55-year-old gentleman with devious history of right pontine infarct in October 2019, essential hypertension, hyperlipidemia, impaired fasting glucose came in to see me today for follow-up of these conditions.  He recovered completely from the infarct.  He had left hemiparesis, dysarthria and diplopia which all improved and pretty much resolved.    Today he comes in stating that at night off-and-on he might feel some tightness in the chest and has heartburn for which he uses Tums.  He does senior living work and does not experience chest tightness or shortness of breath while he is active or working.  Also once a week or so he gets occipital headache which resolves promptly with Tylenol.    He takes his medication as prescribed.  He indicates he tries to exercise on a regular basis.  He has gained few pounds from last clinic visit.     Pt is here for follow up stroke and headaches. Pt had a stroke in November 2019.   Headache  Onset/Duration: a while   Description  Location: bilateral in the occipital area   Character: throbbing pain, dull pain  Frequency:  1 week it depends  Duration:  2 hours  Wake with headaches: no  Able to do daily activities when headache present: YES  Intensity:  moderate  Progression of Symptoms:  same  Accompanying signs and symptoms:  Stiff neck: YES  Neck or upper back pain: no  Sinus or URI symptoms no   Fever: no  Nausea or vomiting: no  Dizziness: no  Numbness/tingling: no  Weakness: no  Visual changes: none  History  Head trauma: no  Family history of migraines: no  Daily pain medication use: no  Previous tests for headaches: no  Neurologist evaluation: no  Precipitating or Alleviating factors (light/sound/sleep/caffeine):   Therapies tried and outcome: Tylenol             Outcome - usually effective  Frequent/daily pain medication use: no      Review of Systems    Constitutional, HEENT, cardiovascular, pulmonary, GI, , musculoskeletal, neuro, skin, endocrine and psych systems are negative, except as otherwise noted.      Objective    There were no vitals taken for this visit.  There is no height or weight on file to calculate BMI.  Physical Exam   GENERAL: healthy, alert and no distress  NECK: no adenopathy, no asymmetry, masses, or scars and thyroid normal to palpation  RESP: lungs clear to auscultation - no rales, rhonchi or wheezes  CV: regular rate and rhythm, normal S1 S2, no S3 or S4, no murmur, click or rub, no peripheral edema and peripheral pulses strong  ABDOMEN: soft, nontender, no hepatosplenomegaly, no masses and bowel sounds normal  MS: no gross musculoskeletal defects noted, no edema  Neurological exam: Strength seen bilaterally symmetrical.  Speech is normal.      EKG performed today shows normal sinus rhythm with right bundle branch block.      Assessment & Plan     1.  Atypical chest pain that occurs at night.  I think is related to GERD and heartburn.  2.  Heartburn.  I will place him on omeprazole 20 mg a day and recheck in 2 months.  3.  History of ischemic stroke.  Right pontine infarct related to small vessel disease most likely secondary to uncontrolled blood pressure resulting in left-sided weakness, dysarthria and diplopia all of which has resolved.  Patient currently on aspirin 81 mg a day.  4.  Essential hypertension not adequately controlled.  Blood pressure mildly elevated today.  I decided to switch him from lisinopril 20 mg once a day to lisinopril HCT 20/25 daily and return for follow-up with BMP in 2 months.  5.  Hyperlipidemia on atorvastatin 80 mg a day.  Will check lipid profile today.  6.  Situational depression that occurred following stroke.  He denies been depressed would like to still continue with citalopram 10 mg a day  7.  Prediabetes.  Will check fasting blood sugar as well as A1c today.  8.  Influenza vaccine provided.  9.   "Screening for colorectal cancer.  Patient referred for colonoscopy exam.  10.  Right bundle branch block      I will get back to him with the results of BMP, A1c and lipids performed today.     BMI:   Estimated body mass index is 29.81 kg/m  as calculated from the following:    Height as of this encounter: 1.689 m (5' 6.5\").    Weight as of this encounter: 85 kg (187 lb 8 oz).   Weight management plan: Discussed healthy diet and exercise guidelines         Return in about 8 weeks (around 1/26/2021).    Georges Fox MD  [facility]    "

## 2020-12-01 NOTE — PATIENT INSTRUCTIONS
STOP Lisinopril   Start Lisinopril hydrochlorothiazide 20/25 once a day  Start Omeprazole 20 mg daily

## 2020-12-14 DIAGNOSIS — I63.9 ACUTE ISCHEMIC STROKE (H): ICD-10-CM

## 2020-12-14 NOTE — TELEPHONE ENCOUNTER
Last Written Prescription Date:  12/4/2019  Last Fill Quantity: 90,  # refills: 3   Last office visit: 7/24/20 with prescribing provider:  tian   Future Office Visit:

## 2020-12-16 RX ORDER — ASPIRIN 81 MG/1
81 TABLET ORAL DAILY
Qty: 90 TABLET | Refills: 2 | Status: SHIPPED | OUTPATIENT
Start: 2020-12-16

## 2020-12-17 NOTE — TELEPHONE ENCOUNTER
Prescription approved per [facility] Refill Protocol.      Ortiz Bergeron RN, BSN, PHN

## 2020-12-21 ENCOUNTER — APPOINTMENT (OUTPATIENT)
Dept: INTERPRETER SERVICES | Facility: CLINIC | Age: 55
End: 2020-12-21
Payer: COMMERCIAL

## 2020-12-21 ENCOUNTER — TELEPHONE (OUTPATIENT)
Dept: FAMILY MEDICINE | Facility: CLINIC | Age: 55
End: 2020-12-21

## 2020-12-21 NOTE — TELEPHONE ENCOUNTER
Left message for patient with help of  to return clinic call regarding scheduling. Patient needs an  appointment for 8 week followup with Georges Fox MD around 1/26/21. Number to clinic given, please assist in scheduling once patient returns clinic call.     Call Center OKAY TO SCHEDULE.    Thanks,   Amanda Russell  Primary Care   [facility]

## 2021-01-26 ENCOUNTER — OFFICE VISIT (OUTPATIENT)
Dept: FAMILY MEDICINE | Facility: CLINIC | Age: 56
End: 2021-01-26
Payer: COMMERCIAL

## 2021-01-26 VITALS
DIASTOLIC BLOOD PRESSURE: 88 MMHG | OXYGEN SATURATION: 96 % | SYSTOLIC BLOOD PRESSURE: 154 MMHG | BODY MASS INDEX: 30.64 KG/M2 | RESPIRATION RATE: 14 BRPM | WEIGHT: 192.7 LBS | HEART RATE: 66 BPM

## 2021-01-26 DIAGNOSIS — Z12.11 SCREENING FOR COLON CANCER: ICD-10-CM

## 2021-01-26 DIAGNOSIS — R73.03 PRE-DIABETES: ICD-10-CM

## 2021-01-26 DIAGNOSIS — E78.5 HYPERLIPIDEMIA LDL GOAL <100: ICD-10-CM

## 2021-01-26 DIAGNOSIS — Z86.73 HISTORY OF ISCHEMIC STROKE: ICD-10-CM

## 2021-01-26 DIAGNOSIS — I10 BENIGN ESSENTIAL HYPERTENSION: Primary | ICD-10-CM

## 2021-01-26 DIAGNOSIS — R12 HEART BURN: ICD-10-CM

## 2021-01-26 LAB
ANION GAP SERPL CALCULATED.3IONS-SCNC: 5 MMOL/L (ref 3–14)
BUN SERPL-MCNC: 12 MG/DL (ref 7–30)
CALCIUM SERPL-MCNC: 8.7 MG/DL (ref 8.5–10.1)
CHLORIDE SERPL-SCNC: 105 MMOL/L (ref 94–109)
CO2 SERPL-SCNC: 28 MMOL/L (ref 20–32)
CREAT SERPL-MCNC: 0.76 MG/DL (ref 0.66–1.25)
GFR SERPL CREATININE-BSD FRML MDRD: >90 ML/MIN/{1.73_M2}
GLUCOSE SERPL-MCNC: 103 MG/DL (ref 70–99)
POTASSIUM SERPL-SCNC: 3.8 MMOL/L (ref 3.4–5.3)
SODIUM SERPL-SCNC: 138 MMOL/L (ref 133–144)

## 2021-01-26 PROCEDURE — T1013 SIGN LANG/ORAL INTERPRETER: HCPCS | Mod: U4 | Performed by: INTERNAL MEDICINE

## 2021-01-26 PROCEDURE — 99214 OFFICE O/P EST MOD 30 MIN: CPT | Performed by: INTERNAL MEDICINE

## 2021-01-26 PROCEDURE — 36415 COLL VENOUS BLD VENIPUNCTURE: CPT | Performed by: INTERNAL MEDICINE

## 2021-01-26 PROCEDURE — 80048 BASIC METABOLIC PNL TOTAL CA: CPT | Performed by: INTERNAL MEDICINE

## 2021-01-26 RX ORDER — LISINOPRIL AND HYDROCHLOROTHIAZIDE 20; 25 MG/1; MG/1
1 TABLET ORAL DAILY
Qty: 90 TABLET | Refills: 3 | Status: SHIPPED | OUTPATIENT
Start: 2021-01-26 | End: 2021-03-25

## 2021-01-26 RX ORDER — AMLODIPINE BESYLATE 5 MG/1
5 TABLET ORAL DAILY
Qty: 60 TABLET | Refills: 0 | Status: SHIPPED | OUTPATIENT
Start: 2021-01-26 | End: 2021-02-19

## 2021-01-26 NOTE — PROGRESS NOTES
Assessment & Plan     1.  Benign essential hypertension with blood pressure not adequately controlled.  Patient currently on lisinopril HCT 20/25.  Will check BMP today.  Decided to add amlodipine 5 mg a day and recheck in 1 month.  2.  History of ischemic stroke.  Pontine hemorrhage related to uncontrolled hypertension in November 2020.  He feels his balance is not quite normal yet but otherwise functioning well.  He is back to his usual location.  3.  Heartburn resolved with omeprazole.  He will continue omeprazole 20 mg a day.  4.  Prediabetes with hemoglobin A1c 6.0 measured on 12/1/2020.  5.  Hyperlipidemia been treated with atorvastatin.  Cholesterol was 155, HDL 60, LDL 69 measured on 12/1/2020.  6.  Screening for colorectal cancer.  Patient referred for colonoscopy exam.        No follow-ups on file.    Georges Fox MD  [facility]    Cecil Marshall is a 55 year old who presents to clinic today for the following health issues     HPI     Patient has come in for follow-up on hypertension, stroke and other health problems as listed in the problem list.  The patient requires a  .  He has been doing well.  He indicates that his heartburn has resolved with omeprazole.  He has been taking lisinopril HCT as recommended.  No side effects noted.  Has not been checking his blood pressure at home.  He takes his medication as prescribed.        Review of Systems   Constitutional, HEENT, cardiovascular, pulmonary, GI, , musculoskeletal, neuro, skin, endocrine and psych systems are negative, except as otherwise noted.      Objective    BP (!) 154/88   Pulse 66   Resp 14   Wt 87.4 kg (192 lb 11.2 oz)   SpO2 96%   BMI 30.64 kg/m    Body mass index is 30.64 kg/m .  Physical Exam   GENERAL: healthy, alert and no distress  NECK: no adenopathy, no asymmetry, masses, or scars and thyroid normal to palpation  RESP: lungs clear to auscultation - no rales, rhonchi or  wheezes  CV: regular rate and rhythm, normal S1 S2, no S3 or S4, no murmur, click or rub, no peripheral edema and peripheral pulses strong  ABDOMEN: soft, nontender, no hepatosplenomegaly, no masses and bowel sounds normal  MS: no gross musculoskeletal defects noted, no edema

## 2021-02-19 ENCOUNTER — OFFICE VISIT (OUTPATIENT)
Dept: FAMILY MEDICINE | Facility: CLINIC | Age: 56
End: 2021-02-19
Payer: COMMERCIAL

## 2021-02-19 VITALS
WEIGHT: 193 LBS | TEMPERATURE: 97.6 F | BODY MASS INDEX: 30.29 KG/M2 | RESPIRATION RATE: 16 BRPM | DIASTOLIC BLOOD PRESSURE: 84 MMHG | OXYGEN SATURATION: 97 % | SYSTOLIC BLOOD PRESSURE: 152 MMHG | HEART RATE: 68 BPM | HEIGHT: 67 IN

## 2021-02-19 DIAGNOSIS — I10 BENIGN ESSENTIAL HYPERTENSION: ICD-10-CM

## 2021-02-19 DIAGNOSIS — Z86.73 HISTORY OF ISCHEMIC STROKE: Primary | ICD-10-CM

## 2021-02-19 PROCEDURE — 99213 OFFICE O/P EST LOW 20 MIN: CPT | Performed by: INTERNAL MEDICINE

## 2021-02-19 RX ORDER — AMLODIPINE BESYLATE 10 MG/1
10 TABLET ORAL DAILY
Qty: 60 TABLET | Refills: 0 | Status: SHIPPED | OUTPATIENT
Start: 2021-02-19 | End: 2021-03-25

## 2021-02-19 ASSESSMENT — MIFFLIN-ST. JEOR: SCORE: 1651.13

## 2021-02-19 ASSESSMENT — PAIN SCALES - GENERAL: PAINLEVEL: NO PAIN (0)

## 2021-02-19 NOTE — PROGRESS NOTES
Assessment & Plan     1.  Benign essential hypertension.  Blood pressure still not at goal.  Will increase amlodipine to 10 mg a day.  Continue with lisinopril HCT 20/25 once a day.  Follow-up with me in 1 month.  If the blood pressure is still elevated a month from now then will consider increasing dose of lisinopril HCT.  2.  History of ischemic stroke related to pontine hemorrhage from uncontrolled hypertension.                 No follow-ups on file.    Georges Fox MD  [facility] YING Marshall is a 55 year old who presents for the following health issues  accompanied by his self and  (phone):    HPI       Hypertension Follow-up      Do you check your blood pressure regularly outside of the clinic? Yes     Are you following a low salt diet? Yes    Are your blood pressures ever more than 140 on the top number (systolic) OR more   than 90 on the bottom number (diastolic), for example 140/90? Yes - sometimes        How many servings of fruits and vegetables do you eat daily?  0-1    On average, how many sweetened beverages do you drink each day (Examples: soda, juice, sweet tea, etc.  Do NOT count diet or artificially sweetened beverages)?   0    How many days per week do you exercise enough to make your heart beat faster? 3 or less    How many minutes a day do you exercise enough to make your heart beat faster? 20 - 29    How many days per week do you miss taking your medication? 0      Patient comes in for follow-up on hypertension.  He is tolerating amlodipine 5 mg very well.  Offers no side effects.  He takes his medication as prescribed.  Has not been checking his blood pressure at home.  No chest pain or shortness of breath.  No leg edema.      Review of Systems   Constitutional, HEENT, cardiovascular, pulmonary, GI, , musculoskeletal, neuro, skin, endocrine and psych systems are negative, except as otherwise noted.      Objective    BP (!) 164/89 (BP  "Location: Right arm, Patient Position: Sitting, Cuff Size: Adult Large)   Pulse 68   Temp 97.6  F (36.4  C) (Tympanic)   Resp 16   Ht 1.689 m (5' 6.5\")   Wt 87.5 kg (193 lb)   SpO2 97%   BMI 30.68 kg/m    Body mass index is 30.68 kg/m .  Physical Exam   GENERAL: healthy, alert and no distress  RESP: lungs clear to auscultation - no rales, rhonchi or wheezes  CV: regular rate and rhythm, normal S1 S2, no S3 or S4, no murmur, click or rub, no peripheral edema and peripheral pulses strong                "

## 2021-02-22 ENCOUNTER — OFFICE VISIT (OUTPATIENT)
Dept: OPHTHALMOLOGY | Facility: CLINIC | Age: 56
End: 2021-02-22
Payer: COMMERCIAL

## 2021-02-22 DIAGNOSIS — H52.03 HYPEROPIA OF BOTH EYES: ICD-10-CM

## 2021-02-22 DIAGNOSIS — H04.123 DRY EYES: Primary | ICD-10-CM

## 2021-02-22 DIAGNOSIS — H52.4 PRESBYOPIA: ICD-10-CM

## 2021-02-22 DIAGNOSIS — H35.033 HYPERTENSIVE RETINOPATHY OF BOTH EYES: ICD-10-CM

## 2021-02-22 DIAGNOSIS — H11.043 STATIONARY PERIPHERAL PTERYGIUM OF BOTH EYES: ICD-10-CM

## 2021-02-22 DIAGNOSIS — H25.013 CORTICAL SENILE CATARACT OF BOTH EYES: ICD-10-CM

## 2021-02-22 PROCEDURE — 92015 DETERMINE REFRACTIVE STATE: CPT | Performed by: OPHTHALMOLOGY

## 2021-02-22 PROCEDURE — 92004 COMPRE OPH EXAM NEW PT 1/>: CPT | Performed by: OPHTHALMOLOGY

## 2021-02-22 ASSESSMENT — REFRACTION
OD_SPHERE: +2.75
OS_SPHERE: +3.25
OS_AXIS: 092
OD_AXIS: 095
OS_CYLINDER: +1.25
OD_CYLINDER: +0.25

## 2021-02-22 ASSESSMENT — REFRACTION_MANIFEST
OS_AXIS: 092
OD_AXIS: 095
OD_CYLINDER: +0.25
OS_SPHERE: +3.75
OS_ADD: +2.50
OD_ADD: +2.50
OS_CYLINDER: +0.75
OD_SPHERE: +3.00

## 2021-02-22 ASSESSMENT — VISUAL ACUITY
OD_CC+: -1
METHOD: SNELLEN - LINEAR
OS_CC: 20/30
OD_CC: 20/25
CORRECTION_TYPE: GLASSES
OS_CC: J3
OD_CC: J3

## 2021-02-22 ASSESSMENT — REFRACTION_WEARINGRX
OD_ADD: +2.50
SPECS_TYPE: BIFOCAL
OD_AXIS: 079
OS_SPHERE: +4.00
OD_SPHERE: +3.25
OD_CYLINDER: +0.25
OS_ADD: +2.50
OS_CYLINDER: +0.75
OS_AXIS: 086

## 2021-02-22 ASSESSMENT — CONF VISUAL FIELD
OD_NORMAL: 1
METHOD: COUNTING FINGERS
OS_NORMAL: 1

## 2021-02-22 ASSESSMENT — SLIT LAMP EXAM - LIDS
COMMENTS: MGD
COMMENTS: MGD

## 2021-02-22 ASSESSMENT — TONOMETRY
OS_IOP_MMHG: 14
IOP_METHOD: ICARE
OD_IOP_MMHG: 15

## 2021-02-22 ASSESSMENT — EXTERNAL EXAM - LEFT EYE: OS_EXAM: NORMAL

## 2021-02-22 ASSESSMENT — CUP TO DISC RATIO
OD_RATIO: 0.3
OS_RATIO: 0.3

## 2021-02-22 ASSESSMENT — EXTERNAL EXAM - RIGHT EYE: OD_EXAM: NORMAL

## 2021-02-22 NOTE — NURSING NOTE
Chief Complaints and History of Present Illnesses   Patient presents with     Annual Eye Exam       Chief Complaint(s) and History of Present Illness(es)     Annual Eye Exam     Laterality: both eyes              Comments     Patient here for annual eye exam. Patient complains of distance vision getting worse. Near vision is okay. Current glasses are 1.5 yrs old. Eyes feel dry, patient uses visine almost every day.                 Amado Potts, Ophthalmic Assistant

## 2021-02-22 NOTE — PROGRESS NOTES
"HPI:  Rolando Granados is a 55 year old male presenting for dry eyes and annual exam.      ASSESSMENT and PLAN:  1. Dry eyes  - currently using Visine drops up to 2x/day with some relief  - discussed increased AT use (not \"get the red out\" drops, just artificial tears/lubricants) QID each eye  - discussed warm compresses    2. Cortical senile cataract of both eyes  - noticing progressive worsening of vision and changing glasses Rx; has noticed some glare around lights but not yet bothersome  - Rx to 20/20 right eye and 20/25 left eye today  - discussed cataracts and will update glasses Rx for now and monitor cataracts; discussed cataract surgery in future as cataracts develop and he will monitor glare symptoms    3. Stationary peripheral pterygium of both eyes  - not visually significant  - discussed may be contributing to dry eyes, using ATs as above  - recommended sunglasses/sun/wind protection    4. Hyperopia of both eyes  5. Presbyopia  - updated glasses Rx as above    6. Hypertensive retinopathy of both eyes  - recently started medications for HTN and reports compliance  - encouraged BS/BP control  - monitor          Return in about 1 year (around 2/22/2022). or sooner PRN        -----------------------------------------------------------------------------------    Attestation:  Complete documentation of historical and exam elements from today's encounter can be found in the full encounter summary report (not reduplicated in this progress note). I personally obtained the chief complaint(s) and history of present illness.  I confirmed and edited as necessary the review of systems, past medical/surgical history, family history, social history, and examination findings as documented by others; and I examined the patient myself. I personally reviewed the relevant tests, images, and reports as documented above.     I formulated and edited as necessary the assessment and plan and discussed the findings and management " plan with the patient and family.      Negar Wu MD

## 2021-02-22 NOTE — PATIENT INSTRUCTIONS
"Puedes usar mas gotas (lagrimas artificiales) por los ojos secos (nilesh Refresh, Blink, Systane. Nada que dice \"quitar el carter\")    Puedes usar tambien compresa calientitas.  "

## 2021-03-21 ENCOUNTER — MYC REFILL (OUTPATIENT)
Dept: PEDIATRICS | Facility: CLINIC | Age: 56
End: 2021-03-21

## 2021-03-21 ENCOUNTER — MYC REFILL (OUTPATIENT)
Dept: FAMILY MEDICINE | Facility: CLINIC | Age: 56
End: 2021-03-21

## 2021-03-21 DIAGNOSIS — I10 BENIGN ESSENTIAL HYPERTENSION: ICD-10-CM

## 2021-03-21 DIAGNOSIS — R12 HEART BURN: ICD-10-CM

## 2021-03-23 RX ORDER — AMLODIPINE BESYLATE 10 MG/1
10 TABLET ORAL DAILY
Qty: 60 TABLET | Refills: 0 | OUTPATIENT
Start: 2021-03-23

## 2021-03-23 NOTE — TELEPHONE ENCOUNTER
Prescription approved per [facility] Refill Protocol.  Marianela Li RN

## 2021-03-25 ENCOUNTER — OFFICE VISIT (OUTPATIENT)
Dept: FAMILY MEDICINE | Facility: CLINIC | Age: 56
End: 2021-03-25
Payer: COMMERCIAL

## 2021-03-25 VITALS
WEIGHT: 191 LBS | HEART RATE: 60 BPM | BODY MASS INDEX: 30.37 KG/M2 | SYSTOLIC BLOOD PRESSURE: 148 MMHG | RESPIRATION RATE: 16 BRPM | TEMPERATURE: 97.6 F | DIASTOLIC BLOOD PRESSURE: 76 MMHG | OXYGEN SATURATION: 97 %

## 2021-03-25 DIAGNOSIS — I10 BENIGN ESSENTIAL HYPERTENSION: ICD-10-CM

## 2021-03-25 DIAGNOSIS — Z86.73 HISTORY OF ISCHEMIC STROKE: Primary | ICD-10-CM

## 2021-03-25 PROCEDURE — 99213 OFFICE O/P EST LOW 20 MIN: CPT | Performed by: INTERNAL MEDICINE

## 2021-03-25 RX ORDER — LISINOPRIL AND HYDROCHLOROTHIAZIDE 20; 25 MG/1; MG/1
2 TABLET ORAL DAILY
Qty: 180 TABLET | Refills: 3 | Status: SHIPPED | OUTPATIENT
Start: 2021-03-25 | End: 2021-07-16

## 2021-03-25 RX ORDER — AMLODIPINE BESYLATE 10 MG/1
10 TABLET ORAL DAILY
Qty: 90 TABLET | Refills: 3 | Status: SHIPPED | OUTPATIENT
Start: 2021-03-25 | End: 2021-07-16

## 2021-03-25 ASSESSMENT — PAIN SCALES - GENERAL: PAINLEVEL: NO PAIN (0)

## 2021-03-25 NOTE — PROGRESS NOTES
Assessment & Plan     1.  Benign essential hypertension.  Blood pressure better but still not at goal.  Stop lisinopril HCT 20/25 and start lisinopril HCT 20/12.52 tablets daily along with amlodipine 10 mg a day.  Follow-up first week of May.  2.  History of ischemic stroke in the past related to uncontrolled hypertension.  3.  Benign skin tag left eyelid.  We will treated with coronary next visit.        No follow-ups on file.    Georges Fox MD  [facility]    Cecil Marshall is a 55 year old who presents for the following health issues     HPI     Hypertension Follow-up      Do you check your blood pressure regularly outside of the clinic? Yes     Are you following a low salt diet? Yes    Are your blood pressures ever more than 140 on the top number (systolic) OR more   than 90 on the bottom number (diastolic), for example 140/90? Yes      How many servings of fruits and vegetables do you eat daily?  0-1    On average, how many sweetened beverages do you drink each day (Examples: soda, juice, sweet tea, etc.  Do NOT count diet or artificially sweetened beverages)?   0    How many days per week do you exercise enough to make your heart beat faster? 3 or less    How many minutes a day do you exercise enough to make your heart beat faster? 20 - 29    How many days per week do you miss taking your medication? 0    Patient indicates that he is tolerating increased dose of amlodipine well.  He has no side effects.  No leg edema.  No chest pain or shortness of breath.  Checks his blood pressure at home and it has come down with an average around 140-145.  The lowest he has had is 130 and the highest 150.  Diastolic is always in the 80s.  New    He has a skin lesion left angle of the eye that has had it for 4 years.  Slightly growing.  He like to have that removed.    Review of Systems   Constitutional, HEENT, cardiovascular, pulmonary, GI, , musculoskeletal, neuro, skin, endocrine  and psych systems are negative, except as otherwise noted.      Objective    There were no vitals taken for this visit.  There is no height or weight on file to calculate BMI.  Physical Exam   GENERAL: healthy, alert and no distress  RESP: lungs clear to auscultation - no rales, rhonchi or wheezes  CV: regular rate and rhythm, normal S1 S2, no S3 or S4, no murmur, click or rub, no peripheral edema and peripheral pulses strong  ABDOMEN: soft, nontender, no hepatosplenomegaly, no masses and bowel sounds normal

## 2021-03-25 NOTE — PATIENT INSTRUCTIONS
STOP Lisinopril Hydrochlorothiazide 20/25   START Lisinopril Hydrochlorothiazide 20/12.5 two tablets a day

## 2021-04-25 ENCOUNTER — HEALTH MAINTENANCE LETTER (OUTPATIENT)
Age: 56
End: 2021-04-25

## 2021-06-19 ENCOUNTER — MYC REFILL (OUTPATIENT)
Dept: PEDIATRICS | Facility: CLINIC | Age: 56
End: 2021-06-19

## 2021-06-19 DIAGNOSIS — R12 HEART BURN: ICD-10-CM

## 2021-06-21 NOTE — TELEPHONE ENCOUNTER
Prescription approved per [facility] Refill Protocol.    Angelica Sauer RN  [facility]

## 2021-07-16 ENCOUNTER — OFFICE VISIT (OUTPATIENT)
Dept: FAMILY MEDICINE | Facility: CLINIC | Age: 56
End: 2021-07-16
Payer: COMMERCIAL

## 2021-07-16 VITALS
RESPIRATION RATE: 14 BRPM | WEIGHT: 191 LBS | SYSTOLIC BLOOD PRESSURE: 180 MMHG | OXYGEN SATURATION: 97 % | DIASTOLIC BLOOD PRESSURE: 88 MMHG | TEMPERATURE: 97.2 F | HEART RATE: 68 BPM | BODY MASS INDEX: 30.37 KG/M2

## 2021-07-16 DIAGNOSIS — I63.9 ACUTE ISCHEMIC STROKE (H): ICD-10-CM

## 2021-07-16 DIAGNOSIS — F43.21 SITUATIONAL DEPRESSION: ICD-10-CM

## 2021-07-16 DIAGNOSIS — R12 HEART BURN: ICD-10-CM

## 2021-07-16 DIAGNOSIS — I10 BENIGN ESSENTIAL HYPERTENSION: ICD-10-CM

## 2021-07-16 DIAGNOSIS — B07.9 VIRAL WARTS, UNSPECIFIED TYPE: Primary | ICD-10-CM

## 2021-07-16 DIAGNOSIS — R73.01 IFG (IMPAIRED FASTING GLUCOSE): ICD-10-CM

## 2021-07-16 DIAGNOSIS — E78.5 HYPERLIPIDEMIA LDL GOAL <100: ICD-10-CM

## 2021-07-16 PROCEDURE — 17110 DESTRUCTION B9 LES UP TO 14: CPT | Performed by: INTERNAL MEDICINE

## 2021-07-16 PROCEDURE — 99214 OFFICE O/P EST MOD 30 MIN: CPT | Mod: 25 | Performed by: INTERNAL MEDICINE

## 2021-07-16 RX ORDER — AMLODIPINE BESYLATE 10 MG/1
10 TABLET ORAL DAILY
Qty: 90 TABLET | Refills: 3 | Status: SHIPPED | OUTPATIENT
Start: 2021-07-16 | End: 2022-02-25

## 2021-07-16 RX ORDER — CITALOPRAM HYDROBROMIDE 20 MG/1
10 TABLET ORAL DAILY
Qty: 90 TABLET | Refills: 3 | Status: SHIPPED | OUTPATIENT
Start: 2021-07-16 | End: 2021-08-26

## 2021-07-16 RX ORDER — LISINOPRIL AND HYDROCHLOROTHIAZIDE 12.5; 2 MG/1; MG/1
2 TABLET ORAL DAILY
Qty: 180 TABLET | Refills: 3 | Status: SHIPPED | OUTPATIENT
Start: 2021-07-16 | End: 2022-02-25

## 2021-07-16 RX ORDER — ROSUVASTATIN CALCIUM 40 MG/1
40 TABLET, COATED ORAL DAILY
Qty: 90 TABLET | Refills: 3 | Status: SHIPPED | OUTPATIENT
Start: 2021-07-16 | End: 2022-02-25

## 2021-07-16 RX ORDER — CLONIDINE HYDROCHLORIDE 0.1 MG/1
0.1 TABLET ORAL 2 TIMES DAILY
Qty: 180 TABLET | Refills: 3 | Status: SHIPPED | OUTPATIENT
Start: 2021-07-16 | End: 2022-02-25

## 2021-07-16 NOTE — PROGRESS NOTES
"    Assessment & Plan     1.  Benign essential hypertension.  Blood pressure high today though at home he claims it is in the 130-140/80 range.  He is upset today morning due to loss of a friend.  Decided to add clonidine 0.1 mg twice a day along with lisinopril HCT 20/12.52 tablets daily and amlodipine 10 mg a day.  Return for recheck in 6 weeks with BMP.  2.  Acute ischemic stroke from which she has recovered almost completely.  No additional neurological symptom is that time.  3.  Situational depression with symptoms not adequately controlled.  Decided to increase citalopram from 10mg to 20 mg daily.  Recheck in 6 weeks.  4.  Hyperlipidemia with last lipid profile on 12/1/2020 in good range with atorvastatin 80 mg a day.  I decided to switch him to rosuvastatin 40 mg a day and will check lipid profile when he returns in 6 weeks.  5.  Impaired fasting glucose.  Will check A1c in 6 weeks.  Weight reduction diet discussed  6.  Viral wart lateral angle of the left eye.  The wart is about 4 mm.  Patient wanted it removed.  Patient was informed of the risk, benefit and alternative treatments were discussed.  He was given opportunity to ask questions and all questions are answered.  He gave verbal consent.  0.5 mL of 1% Xylocaine was applied at the local anesthesia at the base of the wart.  Then using electrocautery the lesion was cauterized and removed.  There was no excessive bleeding post procedure.  He tolerated procedure well.  7.  Heartburn related to GERD.  Omeprazole refill provided.       BMI:   Estimated body mass index is 30.37 kg/m  as calculated from the following:    Height as of 2/19/21: 1.689 m (5' 6.5\").    Weight as of this encounter: 86.6 kg (191 lb).   Weight management plan: Discussed healthy diet and exercise guidelines        Return in about 6 weeks (around 8/24/2021) for visit with fasting lab.    Georges Fox MD  [facility] is a 56 year old who " presents for the following health issues     HPI     -wart on eye? Possible removal, has been there for a while-2 years, growing in size, no pain  -needs refills on medications    Hyperlipidemia Follow-Up      Are you regularly taking any medication or supplement to lower your cholesterol?   Yes- atorvastatin    Are you having muscle aches or other side effects that you think could be caused by your cholesterol lowering medication?  No    Hypertension Follow-up      Do you check your blood pressure regularly outside of the clinic? Yes     Are you following a low salt diet? Yes    Are your blood pressures ever more than 140 on the top number (systolic) OR more   than 90 on the bottom number (diastolic), for example 140/90? Yes    Depression Followup    How are you doing with your depression since your last visit? No change    Are you having other symptoms that might be associated with depression? No    Have you had a significant life event?  yes     Are you feeling anxious or having panic attacks?   No    Do you have any concerns with your use of alcohol or other drugs? No    Patient comes in for follow-up regarding hypertension, previous stroke, dyslipidemia, depression.  He states that he still feels down somewhat.  He cries easily.  He is taking citalopram 10 mg a day and it did help when he first started.  His situation at home is good though he lost a friend yesterday due to diabetes complication.  His upset regarding it today.  His blood pressure readings at home have been good in the 130-140 systolic range diastolic in the 80s.  He denies chest pain or shortness of breath.  No lateralizing tingling numbness weakness.  He has a wart at the angle of the left eye laterally that bothers him and he would like to have it removed.      Social History     Tobacco Use     Smoking status: Former Smoker     Packs/day: 0.25     Years: 20.00     Pack years: 5.00     Quit date: 10/30/2019     Years since quittin.7      Smokeless tobacco: Never Used     Tobacco comment: 5-6 cig per day for 20 years. quit 2019.    Substance Use Topics     Alcohol use: Not Currently     Drug use: Never     PHQ 11/13/2019   PHQ-9 Total Score 0   Q9: Thoughts of better off dead/self-harm past 2 weeks Not at all     MERARY-7 SCORE 11/13/2019   Total Score 0     Last PHQ-9 11/13/2019   1.  Little interest or pleasure in doing things 0   2.  Feeling down, depressed, or hopeless 0   3.  Trouble falling or staying asleep, or sleeping too much 0   4.  Feeling tired or having little energy 0   5.  Poor appetite or overeating 0   6.  Feeling bad about yourself 0   7.  Trouble concentrating 0   8.  Moving slowly or restless 0   Q9: Thoughts of better off dead/self-harm past 2 weeks 0   PHQ-9 Total Score 0   Difficulty at work, home, or with people Not difficult at all     MERARY-7  11/13/2019   1. Feeling nervous, anxious, or on edge 0   2. Not being able to stop or control worrying 0   3. Worrying too much about different things 0   4. Trouble relaxing 0   5. Being so restless that it is hard to sit still 0   6. Becoming easily annoyed or irritable 0   7. Feeling afraid, as if something awful might happen 0   MERARY-7 Total Score 0   If you checked any problems, how difficult have they made it for you to do your work, take care of things at home, or get along with other people? Not difficult at all       Suicide Assessment Five-step Evaluation and Treatment (SAFE-T)      How many servings of fruits and vegetables do you eat daily?  2-3 not necessarily everyday though    On average, how many sweetened beverages do you drink each day (Examples: soda, juice, sweet tea, etc.  Do NOT count diet or artificially sweetened beverages)?   0    How many days per week do you exercise enough to make your heart beat faster? 3 or less    How many minutes a day do you exercise enough to make your heart beat faster? 20 - 29    How many days per week do you miss taking your medication?  0        Review of Systems   Constitutional, HEENT, cardiovascular, pulmonary, GI, , musculoskeletal, neuro, skin, endocrine and psych systems are negative, except as otherwise noted.      Objective    Pulse 68   Temp 97.2  F (36.2  C) (Tympanic)   Resp 14   Wt 86.6 kg (191 lb)   SpO2 97%   BMI 30.37 kg/m    Body mass index is 30.37 kg/m .  Physical Exam   GENERAL: healthy, alert and no distress  NECK: no adenopathy, no asymmetry, masses, or scars and thyroid normal to palpation  RESP: lungs clear to auscultation - no rales, rhonchi or wheezes  CV: regular rate and rhythm, normal S1 S2, no S3 or S4, no murmur, click or rub, no peripheral edema and peripheral pulses strong  ABDOMEN: soft, nontender, no hepatosplenomegaly, no masses and bowel sounds normal  MS: no gross musculoskeletal defects noted, no edema  Skin: About 4 mm benign raised wart at the lateral angle of the left eye.

## 2021-08-24 NOTE — PROGRESS NOTES
{PROVIDER CHARTING PREFERENCE:140257}    Cecil Marshall is a 56 year old who presents for the following health issues {ACCOMPANIED BY STATEMENT (Optional):541636}    HPI     Hyperlipidemia Follow-Up      Are you regularly taking any medication or supplement to lower your cholesterol?   { :503653}    Are you having muscle aches or other side effects that you think could be caused by your cholesterol lowering medication?  { :944936}    Hypertension Follow-up      Do you check your blood pressure regularly outside of the clinic? { :212462}     Are you following a low salt diet? { :979327}    Are your blood pressures ever more than 140 on the top number (systolic) OR more   than 90 on the bottom number (diastolic), for example 140/90? { :547262}    {Depression and Anxiety Followup:789750}    How many servings of fruits and vegetables do you eat daily?  { :252781}    On average, how many sweetened beverages do you drink each day (Examples: soda, juice, sweet tea, etc.  Do NOT count diet or artificially sweetened beverages)?   { 1-11:971744}    How many days per week do you exercise enough to make your heart beat faster? { :001649}    How many minutes a day do you exercise enough to make your heart beat faster? { :133388}    How many days per week do you miss taking your medication? {0-7 :135326}    {additonal problems for provider to add (Optional):054818}    Review of Systems   {ROS COMP (Optional):643734}      Objective    There were no vitals taken for this visit.  There is no height or weight on file to calculate BMI.  Physical Exam   {Exam List (Optional):404505}    {Diagnostic Test Results (Optional):246902}    {AMBULATORY ATTESTATION (Optional):442662}

## 2021-08-24 NOTE — PROGRESS NOTES
Assessment & Plan     1.  Benign essential hypertension.  Currently on amlodipine 10 mg a day, lisinopril HCT 40/25 daily and clonidine 0.1 mg twice a day.  Blood pressure is actually good today little bit on the low side.  At home it varies anywhere from 100-145 systolic according to patient.  Continue current medication.  We will be checking BMP today.  Advise recheck in 6 months.  2.  History of ischemic stroke related to uncontrolled hypertension.  Patient has fully recovered.  3.  Hyperlipidemia been treated with rosuvastatin 40 mg a day.  We will check fasting lipids today and get back to him with the results and recommendation.  4.  Prediabetes.  We will be checking fasting blood sugar and an A1c today.  We will get back to him with recommendation.  5.  Screening for colon cancer.  Screening colonoscopy referral placed.  6.  Need for Shingrix vaccine.  First dose provided today and he will return in couple of months for the second dose.  Advised to have a influenza vaccine at that time.  7.  Situational depression under good control with citalopram 10 mg a day.  He like to continue the same.  No anxiety or depression currently.  In remission.  8.  Screening for hepatitis C, HIV and prostate cancer.  Appropriate labs ordered.        Return in about 6 months (around 2/26/2022) for visit with fasting lab.    Georges Fox MD  [facility] is a 56 year old who presents for the following health issues     HPI     Hyperlipidemia Follow-Up      Are you regularly taking any medication or supplement to lower your cholesterol?   Yes- rosuvastatin    Are you having muscle aches or other side effects that you think could be caused by your cholesterol lowering medication?  No    Hypertension Follow-up      Do you check your blood pressure regularly outside of the clinic? Yes     Are you following a low salt diet? Yes    Are your blood pressures ever more than 140 on the  top number (systolic) OR more   than 90 on the bottom number (diastolic), for example 140/90? Yes      How many servings of fruits and vegetables do you eat daily?  2-3    On average, how many sweetened beverages do you drink each day (Examples: soda, juice, sweet tea, etc.  Do NOT count diet or artificially sweetened beverages)?   0    How many days per week do you exercise enough to make your heart beat faster? 3 or less    How many minutes a day do you exercise enough to make your heart beat faster? 30 - 60    How many days per week do you miss taking your medication? Rare, not every week, maybe once per month    56-year-old gentleman with history of previous is ischemic stroke related to uncontrolled hypertension, hyperlipidemia, impaired fasting glucose has come in for follow-up of these conditions.  He has been feeling good.  He has been monitoring his blood pressure at home.  Overall it is been good and he has not had any dizziness, lightheadedness or neurological symptoms.  He had situational depression following stroke and has been on citalopram 10 mg a day.  He is doing well with it and his depression anxiety is under control.  He like to continue the same dose for now.  He takes his medication consistently and does not miss doses.  Had no concerns today.    Review of Systems   Constitutional, HEENT, cardiovascular, pulmonary, GI, , musculoskeletal, neuro, skin, endocrine and psych systems are negative, except as otherwise noted.      Objective    There were no vitals taken for this visit.  There is no height or weight on file to calculate BMI.  Physical Exam   GENERAL: healthy, alert and no distress  NECK: no adenopathy, no asymmetry, masses, or scars and thyroid normal to palpation  RESP: lungs clear to auscultation - no rales, rhonchi or wheezes  CV: regular rate and rhythm, normal S1 S2, no S3 or S4, no murmur, click or rub, no peripheral edema and peripheral pulses strong  ABDOMEN: soft, nontender,  no hepatosplenomegaly, no masses and bowel sounds normal  MS: no gross musculoskeletal defects noted, no edema

## 2021-08-26 ENCOUNTER — OFFICE VISIT (OUTPATIENT)
Dept: FAMILY MEDICINE | Facility: CLINIC | Age: 56
End: 2021-08-26
Payer: COMMERCIAL

## 2021-08-26 VITALS
TEMPERATURE: 97.1 F | RESPIRATION RATE: 14 BRPM | BODY MASS INDEX: 30.05 KG/M2 | SYSTOLIC BLOOD PRESSURE: 108 MMHG | WEIGHT: 189 LBS | OXYGEN SATURATION: 97 % | HEART RATE: 72 BPM | DIASTOLIC BLOOD PRESSURE: 68 MMHG

## 2021-08-26 DIAGNOSIS — Z23 NEED FOR ZOSTER VACCINATION: ICD-10-CM

## 2021-08-26 DIAGNOSIS — E78.5 HYPERLIPIDEMIA LDL GOAL <100: ICD-10-CM

## 2021-08-26 DIAGNOSIS — Z11.59 NEED FOR HEPATITIS C SCREENING TEST: ICD-10-CM

## 2021-08-26 DIAGNOSIS — I10 BENIGN ESSENTIAL HYPERTENSION: Primary | ICD-10-CM

## 2021-08-26 DIAGNOSIS — R73.03 PRE-DIABETES: ICD-10-CM

## 2021-08-26 DIAGNOSIS — F43.21 SITUATIONAL DEPRESSION: ICD-10-CM

## 2021-08-26 DIAGNOSIS — Z11.4 SCREENING FOR HIV (HUMAN IMMUNODEFICIENCY VIRUS): ICD-10-CM

## 2021-08-26 DIAGNOSIS — Z12.5 SCREENING FOR PROSTATE CANCER: ICD-10-CM

## 2021-08-26 DIAGNOSIS — Z86.73 HISTORY OF ISCHEMIC STROKE: ICD-10-CM

## 2021-08-26 DIAGNOSIS — Z23 NEED FOR VACCINATION: ICD-10-CM

## 2021-08-26 DIAGNOSIS — Z12.11 SCREEN FOR COLON CANCER: ICD-10-CM

## 2021-08-26 LAB
ANION GAP SERPL CALCULATED.3IONS-SCNC: 5 MMOL/L (ref 3–14)
BUN SERPL-MCNC: 12 MG/DL (ref 7–30)
CALCIUM SERPL-MCNC: 9 MG/DL (ref 8.5–10.1)
CHLORIDE BLD-SCNC: 100 MMOL/L (ref 94–109)
CHOLEST SERPL-MCNC: 147 MG/DL
CO2 SERPL-SCNC: 29 MMOL/L (ref 20–32)
CREAT SERPL-MCNC: 0.89 MG/DL (ref 0.66–1.25)
FASTING STATUS PATIENT QL REPORTED: YES
GFR SERPL CREATININE-BSD FRML MDRD: >90 ML/MIN/1.73M2
GLUCOSE BLD-MCNC: 113 MG/DL (ref 70–99)
HBA1C MFR BLD: 6.1 % (ref 0–5.6)
HCV AB SERPL QL IA: NONREACTIVE
HDLC SERPL-MCNC: 49 MG/DL
HIV 1+2 AB+HIV1 P24 AG SERPL QL IA: NONREACTIVE
LDLC SERPL CALC-MCNC: 71 MG/DL
NONHDLC SERPL-MCNC: 98 MG/DL
POTASSIUM BLD-SCNC: 3.3 MMOL/L (ref 3.4–5.3)
PSA SERPL-MCNC: 0.35 UG/L (ref 0–4)
SODIUM SERPL-SCNC: 134 MMOL/L (ref 133–144)
TRIGL SERPL-MCNC: 133 MG/DL

## 2021-08-26 PROCEDURE — G0103 PSA SCREENING: HCPCS | Performed by: INTERNAL MEDICINE

## 2021-08-26 PROCEDURE — 99214 OFFICE O/P EST MOD 30 MIN: CPT | Mod: 25 | Performed by: INTERNAL MEDICINE

## 2021-08-26 PROCEDURE — 86803 HEPATITIS C AB TEST: CPT | Performed by: INTERNAL MEDICINE

## 2021-08-26 PROCEDURE — 80061 LIPID PANEL: CPT | Performed by: INTERNAL MEDICINE

## 2021-08-26 PROCEDURE — 87389 HIV-1 AG W/HIV-1&-2 AB AG IA: CPT | Performed by: INTERNAL MEDICINE

## 2021-08-26 PROCEDURE — 80048 BASIC METABOLIC PNL TOTAL CA: CPT | Performed by: INTERNAL MEDICINE

## 2021-08-26 PROCEDURE — 90471 IMMUNIZATION ADMIN: CPT | Performed by: INTERNAL MEDICINE

## 2021-08-26 PROCEDURE — 36415 COLL VENOUS BLD VENIPUNCTURE: CPT | Performed by: INTERNAL MEDICINE

## 2021-08-26 PROCEDURE — 90750 HZV VACC RECOMBINANT IM: CPT | Performed by: INTERNAL MEDICINE

## 2021-08-26 PROCEDURE — 83036 HEMOGLOBIN GLYCOSYLATED A1C: CPT | Performed by: INTERNAL MEDICINE

## 2021-08-26 RX ORDER — CITALOPRAM HYDROBROMIDE 10 MG/1
10 TABLET ORAL DAILY
Qty: 90 TABLET | Refills: 3 | Status: SHIPPED | OUTPATIENT
Start: 2021-08-26 | End: 2022-02-25

## 2021-08-26 NOTE — NURSING NOTE
Prior to immunization administration, verified patients identity using patient s name and date of birth. Please see Immunization Activity for additional information.     Screening Questionnaire for Adult Immunization    Are you sick today?   No   Do you have allergies to medications, food, a vaccine component or latex?   No   Have you ever had a serious reaction after receiving a vaccination?   No   Do you have a long-term health problem with heart, lung, kidney, or metabolic disease (e.g., diabetes), asthma, a blood disorder, no spleen, complement component deficiency, a cochlear implant, or a spinal fluid leak?  Are you on long-term aspirin therapy?   No   Do you have cancer, leukemia, HIV/AIDS, or any other immune system problem?   No   Do you have a parent, brother, or sister with an immune system problem?   No   In the past 3 months, have you taken medications that affect  your immune system, such as prednisone, other steroids, or anticancer drugs; drugs for the treatment of rheumatoid arthritis, Crohn s disease, or psoriasis; or have you had radiation treatments?   No   Have you had a seizure, or a brain or other nervous system problem?   No   During the past year, have you received a transfusion of blood or blood    products, or been given immune (gamma) globulin or antiviral drug?   No   For women: Are you pregnant or is there a chance you could become       pregnant during the next month?   No   Have you received any vaccinations in the past 4 weeks?   No     Immunization questionnaire answers were all negative.        Per orders of Dr. Fox, injection of Shingrix given by Karly Sorenson. Patient instructed to remain in clinic for 15 minutes afterwards, and to report any adverse reaction to me immediately.       Screening performed by Karly Sorenson on 8/26/2021 at 7:44 AM.

## 2021-08-29 DIAGNOSIS — I10 BENIGN ESSENTIAL HYPERTENSION: Primary | ICD-10-CM

## 2021-08-29 DIAGNOSIS — E87.6 HYPOKALEMIA: ICD-10-CM

## 2021-08-29 RX ORDER — POTASSIUM CHLORIDE 1500 MG/1
20 TABLET, EXTENDED RELEASE ORAL DAILY
Qty: 90 TABLET | Refills: 3 | Status: SHIPPED | OUTPATIENT
Start: 2021-08-29 | End: 2022-02-25

## 2021-10-10 ENCOUNTER — HEALTH MAINTENANCE LETTER (OUTPATIENT)
Age: 56
End: 2021-10-10

## 2022-01-06 NOTE — PATIENT INSTRUCTIONS
Okay per dr. Santos to set up 10mg.    Stop the citalopram medication. Be watchful for mood for 1-2 months after stopping the medication. If you feel depression or anxiety symptoms return, update the clinic and restart the citalopram    Schedule fasting lab only visit in the next few weeks.     Increase lisinopril dose to 20 mg daily for better blood pressure control    Schedule office visit this summer/fall to evaluate the decrease in urination

## 2022-02-18 ENCOUNTER — OFFICE VISIT (OUTPATIENT)
Dept: OPTOMETRY | Facility: CLINIC | Age: 57
End: 2022-02-18
Payer: COMMERCIAL

## 2022-02-18 DIAGNOSIS — H52.4 PRESBYOPIA: ICD-10-CM

## 2022-02-18 DIAGNOSIS — H25.013 CORTICAL SENILE CATARACT OF BOTH EYES: ICD-10-CM

## 2022-02-18 DIAGNOSIS — H11.043 STATIONARY PERIPHERAL PTERYGIUM OF BOTH EYES: ICD-10-CM

## 2022-02-18 DIAGNOSIS — H35.033 HYPERTENSIVE RETINOPATHY OF BOTH EYES: Primary | ICD-10-CM

## 2022-02-18 PROCEDURE — 92004 COMPRE OPH EXAM NEW PT 1/>: CPT | Performed by: OPTOMETRIST

## 2022-02-18 PROCEDURE — 92015 DETERMINE REFRACTIVE STATE: CPT | Performed by: OPTOMETRIST

## 2022-02-18 ASSESSMENT — REFRACTION_WEARINGRX
OD_CYLINDER: SPHERE
OD_SPHERE: +2.75
SPECS_TYPE: BIFOCAL
OS_ADD: +2.75
OS_SPHERE: +3.50
OS_AXIS: 092
OS_CYLINDER: +1.00
OD_ADD: +2.75

## 2022-02-18 ASSESSMENT — TONOMETRY
OS_IOP_MMHG: 13
IOP_METHOD: ICARE
OD_IOP_MMHG: 12

## 2022-02-18 ASSESSMENT — VISUAL ACUITY
CORRECTION_TYPE: GLASSES
OS_CC: 20/25
METHOD: SNELLEN - LINEAR
OD_CC: 20/25
OS_CC+: -2

## 2022-02-18 ASSESSMENT — REFRACTION_MANIFEST
OS_SPHERE: +3.50
OD_ADD: +2.50
OS_ADD: +2.50
OS_CYLINDER: +1.00
OD_CYLINDER: SPHERE
OS_AXIS: 092
OD_SPHERE: +2.75

## 2022-02-18 ASSESSMENT — SLIT LAMP EXAM - LIDS
COMMENTS: MGD
COMMENTS: MGD

## 2022-02-18 ASSESSMENT — EXTERNAL EXAM - LEFT EYE: OS_EXAM: NORMAL

## 2022-02-18 ASSESSMENT — CONF VISUAL FIELD
OD_NORMAL: 1
METHOD: COUNTING FINGERS
OS_NORMAL: 1

## 2022-02-18 ASSESSMENT — CUP TO DISC RATIO
OD_RATIO: 0.3
OS_RATIO: 0.3

## 2022-02-18 ASSESSMENT — EXTERNAL EXAM - RIGHT EYE: OD_EXAM: NORMAL

## 2022-02-18 NOTE — NURSING NOTE
Chief Complaints and History of Present Illnesses   Patient presents with     Annual Eye Exam       Chief Complaint(s) and History of Present Illness(es)     Annual Eye Exam     Laterality: both eyes    Associated symptoms: itching.  Negative for eye pain, floaters and flashes              Comments     Patient here for annual eye exam. Glasses updated after exam last year with Dr. Wu, but patient feels that his distance vision has gotten worse since his last exam. He also has growths on the inner corners of each eye. They have been there for several years, not bothersome to his vision, but would like to know if they can be removed.   No Pain, No Flashes, No Floaters.                   Amado Potts, Ophthalmic Assistant

## 2022-02-18 NOTE — PROGRESS NOTES
Assessment/Plan  (H35.033) Hypertensive retinopathy of both eyes  (primary encounter diagnosis)  Comment: AV nicking. No hemes  Plan: Continue care with PCP. Continued BP control encouraged. Monitor annually.     (H11.043) Stationary peripheral pterygium of both eyes  Comment: Not impacting vision.   Plan: Discussed findings with patient. Patient would like to talk to a specialist at some point about whether surgery would improve comfort. Continued use of artificial tears to help with irritation was encouraged.     (H25.013) Cortical senile cataract of both eyes  Comment: Likely becoming more visually significant.   Plan: Recommended referral to cataract specialist. Patient will call [facility] in about 2 months to schedule an appointment with Dr. Harding (template not yet available for him). Limited improvement in vision with new Rx suggests that developing cataract is most likely responsible for vision changes.     (H52.4) Presbyopia  Plan: REFRACTION [[id number]]        SRx updated. Patient likely will hold off on getting new glasses until appointment with general ophthalmologist.     Complete documentation of historical and exam elements from today's encounter can  be found in the full encounter summary report (not reduplicated in this progress  note). I personally obtained the chief complaint(s) and history of present illness. I  confirmed and edited as necessary the review of systems, past medical/surgical  history, family history, social history, and examination findings as documented by  others; and I examined the patient myself. I personally reviewed the relevant tests,  images, and reports as documented above. I formulated and edited as necessary the  assessment and plan and discussed the findings and management plan with the  patient and family.    Amado Rubio, FANY

## 2022-02-25 ENCOUNTER — OFFICE VISIT (OUTPATIENT)
Dept: FAMILY MEDICINE | Facility: CLINIC | Age: 57
End: 2022-02-25
Payer: COMMERCIAL

## 2022-02-25 VITALS
RESPIRATION RATE: 17 BRPM | SYSTOLIC BLOOD PRESSURE: 128 MMHG | OXYGEN SATURATION: 98 % | BODY MASS INDEX: 29.25 KG/M2 | WEIGHT: 184 LBS | HEART RATE: 64 BPM | DIASTOLIC BLOOD PRESSURE: 70 MMHG

## 2022-02-25 DIAGNOSIS — E78.5 HYPERLIPIDEMIA LDL GOAL <100: ICD-10-CM

## 2022-02-25 DIAGNOSIS — Z23 NEED FOR PROPHYLACTIC VACCINATION AND INOCULATION AGAINST INFLUENZA: ICD-10-CM

## 2022-02-25 DIAGNOSIS — G81.94 LEFT HEMIPLEGIA (H): ICD-10-CM

## 2022-02-25 DIAGNOSIS — R12 HEART BURN: ICD-10-CM

## 2022-02-25 DIAGNOSIS — E87.6 HYPOKALEMIA: ICD-10-CM

## 2022-02-25 DIAGNOSIS — Z12.11 SCREEN FOR COLON CANCER: ICD-10-CM

## 2022-02-25 DIAGNOSIS — Z86.73 HISTORY OF ISCHEMIC STROKE: ICD-10-CM

## 2022-02-25 DIAGNOSIS — Z23 HIGH PRIORITY FOR 2019-NCOV VACCINE: ICD-10-CM

## 2022-02-25 DIAGNOSIS — F43.21 SITUATIONAL DEPRESSION: ICD-10-CM

## 2022-02-25 DIAGNOSIS — R73.01 IFG (IMPAIRED FASTING GLUCOSE): ICD-10-CM

## 2022-02-25 DIAGNOSIS — I10 BENIGN ESSENTIAL HYPERTENSION: Primary | ICD-10-CM

## 2022-02-25 PROBLEM — R73.03 PRE-DIABETES: Status: RESOLVED | Noted: 2019-11-15 | Resolved: 2022-02-25

## 2022-02-25 LAB
ANION GAP SERPL CALCULATED.3IONS-SCNC: 5 MMOL/L (ref 3–14)
BUN SERPL-MCNC: 14 MG/DL (ref 7–30)
CALCIUM SERPL-MCNC: 9.1 MG/DL (ref 8.5–10.1)
CHLORIDE BLD-SCNC: 102 MMOL/L (ref 94–109)
CO2 SERPL-SCNC: 29 MMOL/L (ref 20–32)
CREAT SERPL-MCNC: 0.72 MG/DL (ref 0.66–1.25)
GFR SERPL CREATININE-BSD FRML MDRD: >90 ML/MIN/1.73M2
GLUCOSE BLD-MCNC: 101 MG/DL (ref 70–99)
POTASSIUM BLD-SCNC: 3.5 MMOL/L (ref 3.4–5.3)
SODIUM SERPL-SCNC: 136 MMOL/L (ref 133–144)

## 2022-02-25 PROCEDURE — 90682 RIV4 VACC RECOMBINANT DNA IM: CPT | Performed by: INTERNAL MEDICINE

## 2022-02-25 PROCEDURE — 80048 BASIC METABOLIC PNL TOTAL CA: CPT | Performed by: INTERNAL MEDICINE

## 2022-02-25 PROCEDURE — 90471 IMMUNIZATION ADMIN: CPT | Performed by: INTERNAL MEDICINE

## 2022-02-25 PROCEDURE — 36415 COLL VENOUS BLD VENIPUNCTURE: CPT | Performed by: INTERNAL MEDICINE

## 2022-02-25 PROCEDURE — 99214 OFFICE O/P EST MOD 30 MIN: CPT | Mod: 25 | Performed by: INTERNAL MEDICINE

## 2022-02-25 RX ORDER — CLONIDINE HYDROCHLORIDE 0.1 MG/1
0.1 TABLET ORAL 2 TIMES DAILY
Qty: 180 TABLET | Refills: 3 | Status: SHIPPED | OUTPATIENT
Start: 2022-02-25 | End: 2023-04-19

## 2022-02-25 RX ORDER — POTASSIUM CHLORIDE 1500 MG/1
20 TABLET, EXTENDED RELEASE ORAL DAILY
Qty: 90 TABLET | Refills: 3 | Status: SHIPPED | OUTPATIENT
Start: 2022-02-25 | End: 2023-09-06

## 2022-02-25 RX ORDER — ROSUVASTATIN CALCIUM 40 MG/1
40 TABLET, COATED ORAL DAILY
Qty: 90 TABLET | Refills: 3 | Status: SHIPPED | OUTPATIENT
Start: 2022-02-25 | End: 2023-04-19

## 2022-02-25 RX ORDER — CITALOPRAM HYDROBROMIDE 10 MG/1
10 TABLET ORAL DAILY
Qty: 90 TABLET | Refills: 3 | Status: SHIPPED | OUTPATIENT
Start: 2022-02-25 | End: 2023-05-30

## 2022-02-25 RX ORDER — LISINOPRIL AND HYDROCHLOROTHIAZIDE 12.5; 2 MG/1; MG/1
2 TABLET ORAL DAILY
Qty: 180 TABLET | Refills: 3 | Status: SHIPPED | OUTPATIENT
Start: 2022-02-25 | End: 2023-05-30

## 2022-02-25 RX ORDER — AMLODIPINE BESYLATE 10 MG/1
10 TABLET ORAL DAILY
Qty: 90 TABLET | Refills: 3 | Status: SHIPPED | OUTPATIENT
Start: 2022-02-25 | End: 2023-09-06

## 2022-02-25 ASSESSMENT — PAIN SCALES - GENERAL: PAINLEVEL: NO PAIN (0)

## 2022-02-25 NOTE — LETTER
February 26, 2022      Rolando Granados  [address] [address] [address] [place] [id number]        Dear ,    We are writing to inform you of your test results.    The electrolytes and kidney function is good. Blood sugar is good at 101.  Continue with current medication.   Recheck in six months.     Resulted Orders   Basic metabolic panel   Result Value Ref Range    Sodium 136 133 - 144 mmol/L    Potassium 3.5 3.4 - 5.3 mmol/L    Chloride 102 94 - 109 mmol/L    Carbon Dioxide (CO2) 29 20 - 32 mmol/L    Anion Gap 5 3 - 14 mmol/L    Urea Nitrogen 14 7 - 30 mg/dL    Creatinine 0.72 0.66 - 1.25 mg/dL    Calcium 9.1 8.5 - 10.1 mg/dL    Glucose 101 (H) 70 - 99 mg/dL    GFR Estimate >90 >60 mL/min/1.73m2      Comment:      Effective December 21, 2021 eGFRcr in adults is calculated using the 2021 CKD-EPI creatinine equation which includes age and gender (Jhon et al., NEJM, DOI: 10.1056/OEZFrj9018944)       If you have any questions or concerns, please call the clinic at the number listed above.       Sincerely,      Georges Fox MD

## 2022-02-25 NOTE — PROGRESS NOTES
Assessment & Plan     1.  Essential hypertension under control with amlodipine 10 mg a day, clonidine 0.2 mg twice a day and losartan HCT 40/25 daily.  Continue same medication.  Will check BMP today.  Return for follow-up in 6 months.  2.  History of ischemic stroke involving right pontine in 2019 with left hemiparesis.  3.  Left hemiplegia 2019 as results of pontine stroke.  Patient has had full recovery.  His strength seem bilaterally symmetrical today.  4.  4.  Hypokalemia related to an hypertensive medication.  Patient on potassium supplement.  We will be checking BMP today.  5.  Hyperlipidemia been treated with rosuvastatin 40 mg a day.  Lipids under good control with cholesterol 147 HDL 49 LDL 71 on 8/26/2021.  Continue current treatment.  6.  Impaired fasting glucose with A1c of 6.1 measured on 8/26/2021.  Weight is down from last time so that is good.  We will recheck A1c in 6 months.  7.  Heartburn under control with omeprazole.  Seems like he needs it because when he ran out of it he had increased heartburn.  8.  Situational depression under excellent control and in remission with citalopram 10 mg a day.  Patient like to continue same.  9.  Need for influenza vaccine and booster COVID-19 vaccine which were provided today.  10.  Screening for colon cancer.  I have talked to him previously regarding it and he was agreeable but never followed up with appointment.  He is interested so we will submit a request for colonoscopy again.    Return for follow-up with lipids, A1c, BMP and urine microalbumin in 6 months.    Return in about 6 months (around 8/25/2022) for visit with fasting lab.    Georges Fox MD  [facility] is a 56 year old who presents for the following health issues     HPI     Hyperlipidemia Follow-Up      Are you regularly taking any medication or supplement to lower your cholesterol?   Yes- rosuvastatin    Are you having muscle aches or other  "side effects that you think could be caused by your cholesterol lowering medication?  No but states that some times has a \"tense\" or numb feeling in his left leg, especially after sitting for too long    Hypertension Follow-up      Do you check your blood pressure regularly outside of the clinic? Yes     Are you following a low salt diet? Yes    Are your blood pressures ever more than 140 on the top number (systolic) OR more   than 90 on the bottom number (diastolic), for example 140/90? Yes systolic usually in 140 but diastolic is lower around 60/70    Depression Followup    How are you doing with your depression since your last visit? Improved     Are you having other symptoms that might be associated with depression? No    Have you had a significant life event?  No     Are you feeling anxious or having panic attacks?   No    Do you have any concerns with your use of alcohol or other drugs? No    Social History     Tobacco Use     Smoking status: Former Smoker     Packs/day: 0.25     Years: 20.00     Pack years: 5.00     Quit date: 10/30/2019     Years since quittin.3     Smokeless tobacco: Never Used     Tobacco comment: 5-6 cig per day for 20 years. quit 2019.    Vaping Use     Vaping Use: Never used   Substance Use Topics     Alcohol use: Not Currently     Drug use: Never     PHQ 2019   PHQ-9 Total Score 0   Q9: Thoughts of better off dead/self-harm past 2 weeks Not at all     MERARY-7 SCORE 2019   Total Score 0         Suicide Assessment Five-step Evaluation and Treatment (SAFE-T)      How many servings of fruits and vegetables do you eat daily?  2-3    On average, how many sweetened beverages do you drink each day (Examples: soda, juice, sweet tea, etc.  Do NOT count diet or artificially sweetened beverages)?   0    How many days per week do you exercise enough to make your heart beat faster? 3 or less    How many minutes a day do you exercise enough to make your heart beat faster? 30 - 60    How " many days per week do you miss taking your medication? rarely    56-year-old gentleman with known history of previous right pontine infarct in 2019 with left hemiplegia, hypertension, hyperlipidemia has come in for follow-up.  He has been doing well.  He had a good recovery from stroke and his strength is been good on the left side.  Occasionally he will notice some numbness on the left leg but again after he has been sitting for a while.  Does not notice it otherwise.  No chest pain or shortness of breath.  Takes his medication as prescribed.  Blood pressure reading at home has been good.    Review of Systems   Constitutional, HEENT, cardiovascular, pulmonary, GI, , musculoskeletal, neuro, skin, endocrine and psych systems are negative, except as otherwise noted.      Objective    There were no vitals taken for this visit.  There is no height or weight on file to calculate BMI.  Physical Exam   GENERAL: healthy, alert and no distress  NECK: no adenopathy, no asymmetry, masses, or scars and thyroid normal to palpation  RESP: lungs clear to auscultation - no rales, rhonchi or wheezes  CV: regular rate and rhythm, normal S1 S2, no S3 or S4, no murmur, click or rub, no peripheral edema and peripheral pulses strong  ABDOMEN: soft, nontender, no hepatosplenomegaly, no masses and bowel sounds normal  MS: no gross musculoskeletal defects noted, no edema  NEURO: Normal strength and tone, mentation intact and speech normal

## 2022-05-22 ENCOUNTER — HEALTH MAINTENANCE LETTER (OUTPATIENT)
Age: 57
End: 2022-05-22

## 2022-09-18 ENCOUNTER — HEALTH MAINTENANCE LETTER (OUTPATIENT)
Age: 57
End: 2022-09-18

## 2023-06-04 ENCOUNTER — HEALTH MAINTENANCE LETTER (OUTPATIENT)
Age: 58
End: 2023-06-04

## 2023-09-06 ENCOUNTER — OFFICE VISIT (OUTPATIENT)
Dept: FAMILY MEDICINE | Facility: CLINIC | Age: 58
End: 2023-09-06
Payer: COMMERCIAL

## 2023-09-06 VITALS
BODY MASS INDEX: 28.41 KG/M2 | HEART RATE: 69 BPM | TEMPERATURE: 98.8 F | HEIGHT: 67 IN | SYSTOLIC BLOOD PRESSURE: 155 MMHG | WEIGHT: 181 LBS | RESPIRATION RATE: 16 BRPM | DIASTOLIC BLOOD PRESSURE: 88 MMHG | OXYGEN SATURATION: 98 %

## 2023-09-06 DIAGNOSIS — E87.6 HYPOKALEMIA: ICD-10-CM

## 2023-09-06 DIAGNOSIS — R12 HEART BURN: ICD-10-CM

## 2023-09-06 DIAGNOSIS — Z23 ENCOUNTER FOR IMMUNIZATION: ICD-10-CM

## 2023-09-06 DIAGNOSIS — Z12.11 SCREEN FOR COLON CANCER: ICD-10-CM

## 2023-09-06 DIAGNOSIS — R73.01 IFG (IMPAIRED FASTING GLUCOSE): ICD-10-CM

## 2023-09-06 DIAGNOSIS — Z23 NEED FOR TDAP VACCINATION: ICD-10-CM

## 2023-09-06 DIAGNOSIS — G81.94 LEFT HEMIPLEGIA (H): ICD-10-CM

## 2023-09-06 DIAGNOSIS — Z23 NEED FOR PROPHYLACTIC VACCINATION AND INOCULATION AGAINST INFLUENZA: ICD-10-CM

## 2023-09-06 DIAGNOSIS — Z23 NEED FOR INFLUENZA VACCINATION: ICD-10-CM

## 2023-09-06 DIAGNOSIS — M65.30 TRIGGER FINGER, ACQUIRED: ICD-10-CM

## 2023-09-06 DIAGNOSIS — F43.21 SITUATIONAL DEPRESSION: ICD-10-CM

## 2023-09-06 DIAGNOSIS — Z86.73 HISTORY OF ISCHEMIC STROKE: ICD-10-CM

## 2023-09-06 DIAGNOSIS — Z00.00 ANNUAL PHYSICAL EXAM: Primary | ICD-10-CM

## 2023-09-06 DIAGNOSIS — E78.5 HYPERLIPIDEMIA LDL GOAL <100: ICD-10-CM

## 2023-09-06 DIAGNOSIS — I45.10 RBBB (RIGHT BUNDLE BRANCH BLOCK): ICD-10-CM

## 2023-09-06 DIAGNOSIS — I10 BENIGN ESSENTIAL HYPERTENSION: ICD-10-CM

## 2023-09-06 LAB
ALBUMIN SERPL BCG-MCNC: 4.6 G/DL (ref 3.5–5.2)
ALP SERPL-CCNC: 72 U/L (ref 40–129)
ALT SERPL W P-5'-P-CCNC: 24 U/L (ref 0–70)
ANION GAP SERPL CALCULATED.3IONS-SCNC: 10 MMOL/L (ref 7–15)
AST SERPL W P-5'-P-CCNC: 21 U/L (ref 0–45)
BILIRUB SERPL-MCNC: 0.3 MG/DL
BUN SERPL-MCNC: 15 MG/DL (ref 8–23)
CALCIUM SERPL-MCNC: 9.2 MG/DL (ref 8.8–10.2)
CHLORIDE SERPL-SCNC: 101 MMOL/L (ref 98–107)
CHOLEST SERPL-MCNC: 255 MG/DL
CREAT SERPL-MCNC: 0.78 MG/DL (ref 0.67–1.17)
DEPRECATED HCO3 PLAS-SCNC: 28 MMOL/L (ref 22–29)
EGFRCR SERPLBLD CKD-EPI 2021: >90 ML/MIN/1.73M2
ERYTHROCYTE [DISTWIDTH] IN BLOOD BY AUTOMATED COUNT: 12.6 % (ref 10–15)
GLUCOSE SERPL-MCNC: 113 MG/DL (ref 70–99)
HBA1C MFR BLD: 6.2 % (ref 0–5.6)
HCT VFR BLD AUTO: 46.6 % (ref 40–53)
HDLC SERPL-MCNC: 48 MG/DL
HGB BLD-MCNC: 16 G/DL (ref 13.3–17.7)
LDLC SERPL CALC-MCNC: 175 MG/DL
MCH RBC QN AUTO: 31.5 PG (ref 26.5–33)
MCHC RBC AUTO-ENTMCNC: 34.3 G/DL (ref 31.5–36.5)
MCV RBC AUTO: 92 FL (ref 78–100)
NONHDLC SERPL-MCNC: 207 MG/DL
PLATELET # BLD AUTO: 241 10E3/UL (ref 150–450)
POTASSIUM SERPL-SCNC: 4.1 MMOL/L (ref 3.4–5.3)
PROT SERPL-MCNC: 7.2 G/DL (ref 6.4–8.3)
RBC # BLD AUTO: 5.08 10E6/UL (ref 4.4–5.9)
SODIUM SERPL-SCNC: 139 MMOL/L (ref 136–145)
TRIGL SERPL-MCNC: 159 MG/DL
WBC # BLD AUTO: 6.8 10E3/UL (ref 4–11)

## 2023-09-06 PROCEDURE — 36415 COLL VENOUS BLD VENIPUNCTURE: CPT | Performed by: INTERNAL MEDICINE

## 2023-09-06 PROCEDURE — 99396 PREV VISIT EST AGE 40-64: CPT | Mod: 25 | Performed by: INTERNAL MEDICINE

## 2023-09-06 PROCEDURE — 99214 OFFICE O/P EST MOD 30 MIN: CPT | Mod: 25 | Performed by: INTERNAL MEDICINE

## 2023-09-06 PROCEDURE — 90686 IIV4 VACC NO PRSV 0.5 ML IM: CPT | Performed by: INTERNAL MEDICINE

## 2023-09-06 PROCEDURE — 90715 TDAP VACCINE 7 YRS/> IM: CPT | Performed by: INTERNAL MEDICINE

## 2023-09-06 PROCEDURE — 90472 IMMUNIZATION ADMIN EACH ADD: CPT | Performed by: INTERNAL MEDICINE

## 2023-09-06 PROCEDURE — 90471 IMMUNIZATION ADMIN: CPT | Performed by: INTERNAL MEDICINE

## 2023-09-06 PROCEDURE — 85027 COMPLETE CBC AUTOMATED: CPT | Performed by: INTERNAL MEDICINE

## 2023-09-06 PROCEDURE — 83036 HEMOGLOBIN GLYCOSYLATED A1C: CPT | Performed by: INTERNAL MEDICINE

## 2023-09-06 PROCEDURE — 80053 COMPREHEN METABOLIC PANEL: CPT | Performed by: INTERNAL MEDICINE

## 2023-09-06 PROCEDURE — 80061 LIPID PANEL: CPT | Performed by: INTERNAL MEDICINE

## 2023-09-06 RX ORDER — POTASSIUM CHLORIDE 1500 MG/1
20 TABLET, EXTENDED RELEASE ORAL DAILY
Qty: 90 TABLET | Refills: 1 | Status: SHIPPED | OUTPATIENT
Start: 2023-09-06 | End: 2024-05-14

## 2023-09-06 RX ORDER — ROSUVASTATIN CALCIUM 40 MG/1
40 TABLET, COATED ORAL DAILY
Qty: 90 TABLET | Refills: 1 | Status: SHIPPED | OUTPATIENT
Start: 2023-09-06 | End: 2024-05-14

## 2023-09-06 RX ORDER — AMLODIPINE BESYLATE 10 MG/1
10 TABLET ORAL DAILY
Qty: 90 TABLET | Refills: 3 | Status: SHIPPED | OUTPATIENT
Start: 2023-09-06 | End: 2024-07-30

## 2023-09-06 RX ORDER — CITALOPRAM HYDROBROMIDE 10 MG/1
TABLET ORAL
Qty: 90 TABLET | Refills: 1 | Status: SHIPPED | OUTPATIENT
Start: 2023-09-06 | End: 2024-05-14

## 2023-09-06 RX ORDER — CLONIDINE HYDROCHLORIDE 0.1 MG/1
0.1 TABLET ORAL 2 TIMES DAILY
Qty: 180 TABLET | Refills: 1 | Status: SHIPPED | OUTPATIENT
Start: 2023-09-06 | End: 2024-05-14

## 2023-09-06 RX ORDER — LISINOPRIL AND HYDROCHLOROTHIAZIDE 12.5; 2 MG/1; MG/1
2 TABLET ORAL DAILY
Qty: 180 TABLET | Refills: 1 | Status: SHIPPED | OUTPATIENT
Start: 2023-09-06 | End: 2024-05-14

## 2023-09-06 ASSESSMENT — ENCOUNTER SYMPTOMS
HEADACHES: 0
NERVOUS/ANXIOUS: 0
DYSURIA: 0
DIARRHEA: 0
JOINT SWELLING: 0
FEVER: 0
EYE PAIN: 0
PARESTHESIAS: 0
SORE THROAT: 0
DIZZINESS: 0
HEMATURIA: 0
SHORTNESS OF BREATH: 0
CHILLS: 0
FREQUENCY: 0
CONSTIPATION: 0
ABDOMINAL PAIN: 0
HEARTBURN: 0
ARTHRALGIAS: 0
COUGH: 0
HEMATOCHEZIA: 0
NAUSEA: 0
PALPITATIONS: 0
WEAKNESS: 0
MYALGIAS: 0

## 2023-09-06 NOTE — PROGRESS NOTES
Prior to immunization administration, verified patients identity using patient s name and date of birth. Please see Immunization Activity for additional information.     Screening Questionnaire for Adult Immunization    Are you sick today?   No   Do you have allergies to medications, food, a vaccine component or latex?   No   Have you ever had a serious reaction after receiving a vaccination?   No   Do you have a long-term health problem with heart, lung, kidney, or metabolic disease (e.g., diabetes), asthma, a blood disorder, no spleen, complement component deficiency, a cochlear implant, or a spinal fluid leak?  Are you on long-term aspirin therapy?   No   Do you have cancer, leukemia, HIV/AIDS, or any other immune system problem?   No   Do you have a parent, brother, or sister with an immune system problem?   No   In the past 3 months, have you taken medications that affect  your immune system, such as prednisone, other steroids, or anticancer drugs; drugs for the treatment of rheumatoid arthritis, Crohn s disease, or psoriasis; or have you had radiation treatments?   No   Have you had a seizure, or a brain or other nervous system problem?   No   During the past year, have you received a transfusion of blood or blood    products, or been given immune (gamma) globulin or antiviral drug?   No   For women: Are you pregnant or is there a chance you could become       pregnant during the next month?   No   Have you received any vaccinations in the past 4 weeks?   No     Immunization questionnaire answers were all negative.      Patient instructed to remain in clinic for 15 minutes afterwards, and to report any adverse reactions.     Screening performed by Aparna Colmenares MA on 9/6/2023 at 9:29 AM.  Answers submitted by the patient for this visit:  Annual Preventive Visit (Submitted on 9/6/2023)  Chief Complaint: Annual Exam:  Frequency of exercise:: 4-5 days/week  Getting at least 3 servings of Calcium per day::  Yes  Diet:: Regular (no restrictions)  Taking medications regularly:: Yes  Medication side effects:: Not applicable, None  Bi-annual eye exam:: Yes  Dental care twice a year:: Yes  Sleep apnea or symptoms of sleep apnea:: None  abdominal pain: No  Blood in stool: No  Blood in urine: No  chest pain: No  chills: No  congestion: No  constipation: No  cough: No  diarrhea: No  dizziness: No  ear pain: No  eye pain: No  nervous/anxious: No  fever: No  frequency: No  genital sores: No  headaches: No  hearing loss: No  heartburn: No  arthralgias: No  joint swelling: No  peripheral edema: No  mood changes: No  myalgias: No  nausea: No  dysuria: No  palpitations: No  Skin sensation changes: No  sore throat: No  urgency: No  rash: No  shortness of breath: No  visual disturbance: No  weakness: No  impotence: No  penile discharge: No  Additional concerns today:: No  Exercise outside of work (Submitted on 9/6/2023)  Chief Complaint: Annual Exam:  Duration of exercise:: 45-60 minutes

## 2023-09-06 NOTE — PROGRESS NOTES
SUBJECTIVE:   CC: Rolando is an 58 year old who presents for preventative health visit.     58-year-old gentleman came in for a physical examination and follow-up on chronic health issues.  He had failed to show up for follow-up as previously recommended.  He has a history of hypertension, ischemic stroke involving the right pontine region in 2019 with resultant left hemiparesis, hyperlipidemia, impaired fasting glucose and depression.  Indicates that he ran out of some of his blood pressure medication and others about a week ago so he has not taken them.    Overall he has been feeling okay.  He has noticed painful trigger finger of the left ring finger.  He never followed through colonoscopy as previously recommended.  Patient only speaks Syriac similar required an .        9/6/2023     8:47 AM   Additional Questions   Roomed by Aparna   Accompanied by Self         9/6/2023     8:47 AM   Patient Reported Additional Medications   Patient reports taking the following new medications None       Healthy Habits:     Getting at least 3 servings of Calcium per day:  Yes    Bi-annual eye exam:  Yes    Dental care twice a year:  Yes    Sleep apnea or symptoms of sleep apnea:  None    Diet:  Regular (no restrictions)    Frequency of exercise:  4-5 days/week    Duration of exercise:  45-60 minutes    Taking medications regularly:  Yes    Medication side effects:  Not applicable and None    Additional concerns today:  No      Today's PHQ-2 Score:       9/6/2023     8:52 AM   PHQ-2 ( 1999 [employer])   Q1: Little interest or pleasure in doing things 0   Q2: Feeling down, depressed or hopeless 0   PHQ-2 Score 0   Q1: Little interest or pleasure in doing things Not at all   Q2: Feeling down, depressed or hopeless Not at all   PHQ-2 Score 0                   Have you ever done Advance Care Planning? (For example, a Health Directive, POLST, or a discussion with a medical provider or your loved ones about your wishes):      Social History     Tobacco Use    Smoking status: Former     Packs/day: 0.25     Years: 20.00     Pack years: 5.00     Types: Cigarettes     Quit date: 10/30/2019     Years since quitting: 3.8    Smokeless tobacco: Never    Tobacco comments:     5-6 cig per day for 20 years. quit 2019.    Substance Use Topics    Alcohol use: Not Currently             9/6/2023     8:52 AM   Alcohol Use   Prescreen: >3 drinks/day or >7 drinks/week? No       Last PSA:   Prostate Specific Antigen Screen   Date Value Ref Range Status   08/26/2021 0.35 0.00 - 4.00 ug/L Final       Reviewed orders with patient. Reviewed health maintenance and updated orders accordingly - Yes  BP Readings from Last 3 Encounters:   09/06/23 (!) 155/88   02/25/22 128/70   08/26/21 108/68    Wt Readings from Last 3 Encounters:   09/06/23 82.1 kg (181 lb)   02/25/22 83.5 kg (184 lb)   08/26/21 85.7 kg (189 lb)                  Patient Active Problem List   Diagnosis    History of ischemic stroke    Situational depression    Hyperlipidemia LDL goal <100    Benign essential hypertension    Heart burn    RBBB (right bundle branch block)    Hypertensive retinopathy of both eyes    Presbyopia    Hyperopia of both eyes    Stationary peripheral pterygium of both eyes    Cortical senile cataract of both eyes    Dry eyes    IFG (impaired fasting glucose)    Left hemiplegia (H)     Past Surgical History:   Procedure Laterality Date    NO HISTORY OF SURGERY         Social History     Tobacco Use    Smoking status: Former     Packs/day: 0.25     Years: 20.00     Pack years: 5.00     Types: Cigarettes    Smokeless tobacco: Never    Tobacco comments:     5-6 cig per day for 20 years. quit 2019.    Substance Use Topics    Alcohol use: Not Currently     Family History   Problem Relation Age of Onset    Diabetes Sister          Current Outpatient Medications   Medication Sig Dispense Refill    acetaminophen (TYLENOL) 325 MG tablet Take 1-2 tablets (325-650 mg) by mouth  every 6 hours as needed for mild pain 100 tablet 4    amLODIPine (NORVASC) 10 MG tablet Take 1 tablet (10 mg) by mouth daily 90 tablet 3    aspirin 81 MG EC tablet Take 1 tablet (81 mg) by mouth daily 90 tablet 2    citalopram (CELEXA) 10 MG tablet TAKE 1 TABLET BY MOUTH EVERY DAY. WEAN OFF AS ABLE. MAY RESUME IF SYMPTOMS RETURN. 90 tablet 2    cloNIDine (CATAPRES) 0.1 MG tablet Take 1 tablet (0.1 mg) by mouth 2 times daily [facility] refill provided. Need appointment with Dr. Fox. 60 tablet 0    lisinopril-hydrochlorothiazide (ZESTORETIC) 20-12.5 MG tablet TAKE 2 TABLETS BY MOUTH DAILY 180 tablet 2    omeprazole (PRILOSEC) 20 MG DR capsule TAKE 1 CAPSULE(20 MG) BY MOUTH DAILY 90 capsule 2    potassium chloride ER (KLOR-CON M) 20 MEQ CR tablet Take 1 tablet (20 mEq) by mouth daily 90 tablet 3    rosuvastatin (CRESTOR) 40 MG tablet Take 1 tablet (40 mg) by mouth daily [facility] refill provided. Need appointment before further refill. 30 tablet 0    senna-docusate (SENOKOT-S/PERICOLACE) 8.6-50 MG tablet Take 1-2 tablets by mouth daily as needed for constipation 100 tablet 3     No Known Allergies    Reviewed and updated as needed this visit by clinical staff   Tobacco   Meds              Reviewed and updated as needed this visit by Provider                 Past Medical History:   Diagnosis Date    Heart burn 12/1/2020    IFG (impaired fasting glucose) 7/16/2021    RBBB (right bundle branch block) 12/1/2020      Past Surgical History:   Procedure Laterality Date    NO HISTORY OF SURGERY         Review of Systems   Constitutional:  Negative for chills and fever.   HENT:  Negative for congestion, ear pain, hearing loss and sore throat.    Eyes:  Negative for pain and visual disturbance.   Respiratory:  Negative for cough and shortness of breath.    Cardiovascular:  Negative for chest pain, palpitations and peripheral edema.   Gastrointestinal:  Negative for abdominal pain, constipation, diarrhea, heartburn, hematochezia and  "nausea.   Genitourinary:  Negative for dysuria, frequency, genital sores, hematuria, impotence, penile discharge and urgency.   Musculoskeletal:  Negative for arthralgias, joint swelling and myalgias.   Skin:  Negative for rash.   Neurological:  Negative for dizziness, weakness, headaches and paresthesias.   Psychiatric/Behavioral:  Negative for mood changes. The patient is not nervous/anxious.      CONSTITUTIONAL: NEGATIVE for fever, chills, change in weight  INTEGUMENTARY/SKIN: NEGATIVE for worrisome rashes, moles or lesions  EYES: NEGATIVE for vision changes or irritation  ENT: NEGATIVE for ear, mouth and throat problems  RESP: NEGATIVE for significant cough or SOB  CV: NEGATIVE for chest pain, palpitations or peripheral edema  GI: NEGATIVE for nausea, abdominal pain, heartburn, or change in bowel habits   male: negative for dysuria, hematuria, decreased urinary stream, erectile dysfunction, urethral discharge  MUSCULOSKELETAL: NEGATIVE for significant arthralgias or myalgia.  Painful triggering of the left hand ring finger.    NEURO: NEGATIVE for weakness, dizziness or paresthesias  ENDOCRINE: NEGATIVE for temperature intolerance, skin/hair changes  HEME/ALLERGY/IMMUNE: NEGATIVE for bleeding problems  PSYCHIATRIC: NEGATIVE for changes in mood or affect    OBJECTIVE:   BP (!) 164/96 (BP Location: Right arm, Patient Position: Sitting, Cuff Size: Adult Large)   Pulse 61   Temp 98.8  F (37.1  C) (Oral)   Resp 16   Ht 1.69 m (5' 6.54\")   Wt 82.1 kg (181 lb)   SpO2 98%   BMI 28.75 kg/m      Physical Exam  GENERAL: healthy, alert and no distress  EYES: Eyes grossly normal to inspection, PERRL and conjunctivae and sclerae normal  HENT: ear canals and TM's normal, nose and mouth without ulcers or lesions  NECK: no adenopathy, no asymmetry, masses, or scars and thyroid normal to palpation  RESP: lungs clear to auscultation - no rales, rhonchi or wheezes  CV: regular rate and rhythm, normal S1 S2, no S3 or S4, no " murmur, click or rub, no peripheral edema and peripheral pulses strong  ABDOMEN: soft, nontender, no hepatosplenomegaly, no masses and bowel sounds normal   (male): normal male genitalia without lesions or urethral discharge, no hernia  MS: no gross musculoskeletal defects noted, no edema.  Left hand examination reveals trigger finger of the left ring finger.  SKIN: no suspicious lesions or rashes  NEURO: Normal strength and tone, mentation intact and speech normal  PSYCH: mentation appears normal, affect normal/bright  LYMPH: no cervical, supraclavicular, axillary, or inguinal adenopathy  Diabetic foot exam: normal DP and PT pulses, no trophic changes or ulcerative lesions, and normal sensory exam        ASSESSMENT/PLAN:     1.  Annual physical exam completed.  2.  Essential hypertension with blood pressure elevated today because he ran out of some of his blood pressure medication.  Medication refilled and asked to return for follow-up at least in 6 months.  3.  History of ischemic stroke involving the left pontine in 2019 with left hemiparesis.  He is completely and fully recovered.  4.  Hyperlipidemia been treated with rosuvastatin 40 mg a day.  We will check his lipids today.  But not sure if he has been taking it because he has been running out of some of his medication.  5.  Impaired fasting glucose.  I will be checking A1c.  6.  Situational depression symptoms controlled and in remission.  Refill citalopram.  7.  Heartburn under control with omeprazole 20 mg a day refill provided.  8.  Right bundle branch block chronic.  9.  Hypokalemia being treated with potassium supplement.  Refill provided.  We will be checking electrolytes today.  10.  Screening for colon cancer.  Decided to do FIT test since he has not followed through with colonoscopy on more than one occasion as previously recommended.  11.  Tdap and influenza vaccine provided today.  12.  Acquired trigger finger left ring finger.  Referred to  "orthopedics    Patient scheduled for CMP, CBC, A1c and lipids.  I will get back to her with recommendation.  Tentatively scheduled for follow-up in 6 months.  Advised to monitor blood pressure at home.     Patient has been advised of split billing requirements and indicates understanding: Yes      COUNSELING:   Reviewed preventive health counseling, as reflected in patient instructions       Regular exercise       Healthy diet/nutrition       Vision screening      BMI:   Estimated body mass index is 28.75 kg/m  as calculated from the following:    Height as of this encounter: 1.69 m (5' 6.54\").    Weight as of this encounter: 82.1 kg (181 lb).   Weight management plan: Discussed healthy diet and exercise guidelines      He reports that he quit smoking about 3 years ago. His smoking use included cigarettes. He has a 5.00 pack-year smoking history. He has never used smokeless tobacco.            Georges Fox MD  [facility]  "

## 2023-09-06 NOTE — LETTER
September 7, 2023      Rolando Granados  [address]   [place] [facility] [place] [facility] [place] [address]        Dear ,    We are writing to inform you of your test results.    Your white count and hemoglobin is normal.  The electrolytes including sodium potassium is normal.  The kidney function study (creatinine and BUN) is normal.  The liver test (alkaline phosphatase, ALT, AST, albumin, bilirubin) is normal.  Your blood sugar is mildly elevated 113 and hemoglobin A1c, which is a measure of average blood sugar the past 3 months is at 6.2.  This is consistent with prediabetes.  You need to follow a low carbohydrate diet.  We should recheck your blood sugar again in 6 months.  Your cholesterol level was high at 255.  The good cholesterol (HDL) is in good range at 48.  The bad cholesterol (LDL) is high at 175.  Like to see it below 100.  Triglyceride another form of fat is borderline high at 159.  Your cholesterol is high perhaps because you had not been taking your cholesterol medication.  Please take it consistently and we will recheck it again when you return for follow-up in 6 months.  Your blood pressure was also bit high during this clinic visit and in view of previous stroke, it is very important that you monitor your blood pressure closely and take your medication regularly.    Resulted Orders   CBC with platelets   Result Value Ref Range    WBC Count 6.8 4.0 - 11.0 10e3/uL    RBC Count 5.08 4.40 - 5.90 10e6/uL    Hemoglobin 16.0 13.3 - 17.7 g/dL    Hematocrit 46.6 40.0 - 53.0 %    MCV 92 78 - 100 fL    MCH 31.5 26.5 - 33.0 pg    MCHC 34.3 31.5 - 36.5 g/dL    RDW 12.6 10.0 - 15.0 %    Platelet Count 241 150 - 450 10e3/uL   Comprehensive metabolic panel   Result Value Ref Range    Sodium 139 136 - 145 mmol/L    Potassium 4.1 3.4 - 5.3 mmol/L    Chloride 101 98 - 107 mmol/L    Carbon Dioxide (CO2) 28 22 - 29 mmol/L    Anion Gap 10 7 - 15 mmol/L    Urea Nitrogen 15.0 8.0 - 23.0 mg/dL    Creatinine 0.78 0.67 - 1.17 mg/dL     Calcium 9.2 8.8 - 10.2 mg/dL    Glucose 113 (H) 70 - 99 mg/dL    Alkaline Phosphatase 72 40 - 129 U/L    AST 21 0 - 45 U/L      Comment:      Reference intervals for this test were updated on 6/12/2023 to more accurately reflect our healthy population. There may be differences in the flagging of prior results with similar values performed with this method. Interpretation of those prior results can be made in the context of the updated reference intervals.    ALT 24 0 - 70 U/L      Comment:      Reference intervals for this test were updated on 6/12/2023 to more accurately reflect our healthy population. There may be differences in the flagging of prior results with similar values performed with this method. Interpretation of those prior results can be made in the context of the updated reference intervals.      Protein Total 7.2 6.4 - 8.3 g/dL    Albumin 4.6 3.5 - 5.2 g/dL    Bilirubin Total 0.3 <=1.2 mg/dL    GFR Estimate >90 >60 mL/min/1.73m2   Hemoglobin A1c   Result Value Ref Range    Hemoglobin A1C 6.2 (H) 0.0 - 5.6 %      Comment:      Normal <5.7%   Prediabetes 5.7-6.4%    Diabetes 6.5% or higher     Note: Adopted from ADA consensus guidelines.   Lipid Profile   Result Value Ref Range    Cholesterol 255 (H) <200 mg/dL    Triglycerides 159 (H) <150 mg/dL    Direct Measure HDL 48 >=40 mg/dL    LDL Cholesterol Calculated 175 (H) <=100 mg/dL    Non HDL Cholesterol 207 (H) <130 mg/dL    Narrative    Cholesterol  Desirable:  <200 mg/dL    Triglycerides  Normal:  Less than 150 mg/dL  Borderline High:  150-199 mg/dL  High:  200-499 mg/dL  Very High:  Greater than or equal to 500 mg/dL    Direct Measure HDL  Female:  Greater than or equal to 50 mg/dL   Male:  Greater than or equal to 40 mg/dL    LDL Cholesterol  Desirable:  <100mg/dL  Above Desirable:  100-129 mg/dL   Borderline High:  130-159 mg/dL   High:  160-189 mg/dL   Very High:  >= 190 mg/dL    Non HDL Cholesterol  Desirable:  130 mg/dL  Above Desirable:   130-159 mg/dL  Borderline High:  160-189 mg/dL  High:  190-219 mg/dL  Very High:  Greater than or equal to 220 mg/dL       If you have any questions or concerns, please call the clinic at the number listed above.       Sincerely,      Georges Fox MD

## 2023-10-15 ENCOUNTER — APPOINTMENT (OUTPATIENT)
Dept: CT IMAGING | Facility: CLINIC | Age: 58
End: 2023-10-15
Attending: EMERGENCY MEDICINE
Payer: OTHER MISCELLANEOUS

## 2023-10-15 ENCOUNTER — HOSPITAL ENCOUNTER (EMERGENCY)
Facility: CLINIC | Age: 58
Discharge: HOME | End: 2023-10-15
Attending: EMERGENCY MEDICINE | Admitting: EMERGENCY MEDICINE
Payer: OTHER MISCELLANEOUS

## 2023-10-15 ENCOUNTER — APPOINTMENT (OUTPATIENT)
Dept: GENERAL RADIOLOGY | Facility: CLINIC | Age: 58
End: 2023-10-15
Attending: EMERGENCY MEDICINE
Payer: OTHER MISCELLANEOUS

## 2023-10-15 VITALS
SYSTOLIC BLOOD PRESSURE: 152 MMHG | DIASTOLIC BLOOD PRESSURE: 88 MMHG | HEIGHT: 65 IN | OXYGEN SATURATION: 96 % | BODY MASS INDEX: 27.49 KG/M2 | TEMPERATURE: 97.8 F | WEIGHT: 165 LBS | RESPIRATION RATE: 12 BRPM | HEART RATE: 55 BPM

## 2023-10-15 DIAGNOSIS — M54.6 ACUTE BILATERAL THORACIC BACK PAIN: ICD-10-CM

## 2023-10-15 DIAGNOSIS — S09.90XA INJURY OF HEAD, INITIAL ENCOUNTER: ICD-10-CM
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
## 2023-10-15 PROCEDURE — 71046 X-RAY EXAM CHEST 2 VIEWS: CPT

## 2023-10-15 PROCEDURE — 99284 EMERGENCY DEPT VISIT MOD MDM: CPT | Mod: 25

## 2023-10-15 PROCEDURE — 70450 CT HEAD/BRAIN W/O DYE: CPT

## 2023-10-15 ASSESSMENT — ACTIVITIES OF DAILY LIVING (ADL): ADLS_ACUITY_SCORE: 33

## 2023-10-15 NOTE — ED TRIAGE NOTES
Fall - pt slipped in bathroom while at work falling into stall hitting frontal head - no LOC - denies any neck pain - pt complaining of upper back pain   Unsure of blood thinners      Triage Assessment (Adult)       Row Name 10/15/23 1134          Triage Assessment    Airway WDL WDL        Respiratory WDL    Respiratory WDL WDL        Cardiac WDL    Cardiac WDL WDL        Peripheral/Neurovascular WDL    Peripheral Neurovascular WDL WDL        Cognitive/Neuro/Behavioral WDL    Cognitive/Neuro/Behavioral WDL WDL

## 2023-10-15 NOTE — ED PROVIDER NOTES
History     Chief Complaint:  Fall       HPI   Rolando Granados is a 58 year old male who presents with concern of a fall.  This occurred on Friday.  Occurred while he was at work.  He works for [employer], a cleaning company, and was in [place] when this fall occurred.  The patient was walking into the bathroom.  There was a slippery substance on the floor and he slipped and fell.  He fell on his outstretched arms.  He has upper back pain.  He also hit his head.  He has had a prior stroke.  They are unsure if he is on any blood thinners.  The patient reports a headache, but it is improving.  He has been able to ambulate.  No arm or leg pain.  Did not take anything for this pain at home.  No fevers.  No additional injury.    Of note, patient is Sami-speaking and declined an .  He and daughter desired for the daughter to interpret.    Independent Historian:   Daughter is at bedside who provided some of the above history.    Review of External Notes:   None       Medications:    acetaminophen (TYLENOL) 325 MG tablet  amLODIPine (NORVASC) 10 MG tablet  aspirin 81 MG EC tablet  citalopram (CELEXA) 10 MG tablet  cloNIDine (CATAPRES) 0.1 MG tablet  lisinopril-hydrochlorothiazide (ZESTORETIC) 20-12.5 MG tablet  omeprazole (PRILOSEC) 20 MG DR capsule  potassium chloride ER (KLOR-CON M) 20 MEQ CR tablet  rosuvastatin (CRESTOR) 40 MG tablet  senna-docusate (SENOKOT-S/PERICOLACE) 8.6-50 MG tablet        Past Medical History:    Past Medical History:   Diagnosis Date    Heart burn 12/1/2020    IFG (impaired fasting glucose) 7/16/2021    RBBB (right bundle branch block) 12/1/2020       Past Surgical History:    Past Surgical History:   Procedure Laterality Date    NO HISTORY OF SURGERY          Physical Exam   Patient Vitals for the past 24 hrs:   BP Temp Temp src Pulse Resp SpO2 Height Weight   10/15/23 1329 (!) 152/88 97.8  F (36.6  C) Oral 55 12 96 % -- --   10/15/23 1136 (!) 177/78 97.8  F (36.6  C) Oral 64 20 97 %  "1.651 m (5' 5\") 74.8 kg (165 lb)        Physical Exam  Physical Exam   General:  Laying on bed with daughter at bedside. Pt in no distress.  HENT:  No obvious trauma to head, specifically no hematoma or laceration or abrasion to the head. Negative raccoon eyes bilaterally.  Right Ear:  External ear normal.  Left Ear:  External ear normal.  Nose:  Nose normal.  Eyes:  Conjunctivae and EOM are normal. Pupils are equal, round, and reactive.   Neck: Normal range of motion. Neck supple. No tracheal deviation present. No midline cervical neck tenderness, deformity, step off or pain in the midline with ROM.  CV:  Normal heart sounds. No murmur heard.  Pulm/Chest: Effort normal and breath sounds normal.  Tenderness to the back diffusely to the bilateral upper lateral ribs.  Abd: Soft. No distension. There is no tenderness. There is no rigidity, no rebound and no guarding.   M/S: Normal range of motion. No pain to palpation or deformity of all 4 extremities. No pain to palpation of step off to thoracic and lumbar spine.  Neuro: Alert. CN II-XII Grossly intact. GCS 15.  Skin: Skin is warm and dry. No rash noted. Not diaphoretic.   Psych: Normal mood and affect. Behavior is normal.         Emergency Department Course   Imaging:  Chest XR,  PA & LAT   Final Result   IMPRESSION: Mild left basilar atelectasis. Small right pleural effusion. The cardiac silhouette and pulmonary vasculature are normal.      Head CT w/o contrast   Final Result   IMPRESSION:   1.  No acute traumatic intracranial abnormality.               Emergency Department Course & Assessments:  Interventions:  Medications - No data to display     Assessments:  As below    Independent Interpretation (X-rays, CTs, rhythm strip):  None    Consultations/Discussion of Management or Tests:  None   ED Course as of 10/15/23 1354   Sun Oct 15, 2023   1353 Independent interpretation of chest x-ray shows no pneumothorax or rib fractures.   1354 Updated patient.  Treatment " plan discussed.       Social Determinants of Health affecting care:   None    Disposition:  The patient was discharged to home.     Impression & Plan    Medical Decision Making:  Rolando Granados is a very pleasant 58 year old year old patient who presents to the emergency department with concern of a fall.  This occurred at work.  He slipped and had a mechanical fall.  He presents because he hit his head.  He brought in his medications which did not include aspirin but chart review does show he is on a daily aspirin.  Because of this and the head injury, I did perform a CT scan of the head.  Fortunately is no evidence of intracranial hemorrhage.  His symptoms have improved.  Does not sound like any significant concussion.  His neck was cleared by Nexus criteria.  He had upper back pain.  The chest x-ray was performed that shows no pneumothorax or rib fracture.  No other injury associated with this.  Recommended Tylenol as needed.  Close follow-up with PCP.    The treatment plan was discussed with the patient and they expressed understanding of this plan and consented to the plan.  In addition, the patient will return to the emergency department if their symptoms persist, worsen, if new symptoms arise or if there is any concern as other pathology may be present that is not evident at this time. They also understand the importance of close follow up in the clinic and if unable to do so will return to the emergency department for a reevaluation. All questions were answered.    Diagnosis:    ICD-10-CM    1. Fall, initial encounter  W19.XXXA       2. Injury of head, initial encounter  S09.90XA       3. Acute bilateral thoracic back pain  M54.6            Discharge Medications:  New Prescriptions    No medications on file     10/15/2023   Cong Ahmadi, Cong Yip DO  10/15/23 1400

## 2024-01-05 ENCOUNTER — TELEPHONE (OUTPATIENT)
Dept: FAMILY MEDICINE | Facility: CLINIC | Age: 59
End: 2024-01-05

## 2024-05-14 DIAGNOSIS — E78.5 HYPERLIPIDEMIA LDL GOAL <100: ICD-10-CM

## 2024-05-14 DIAGNOSIS — R12 HEART BURN: ICD-10-CM

## 2024-05-14 DIAGNOSIS — E87.6 HYPOKALEMIA: ICD-10-CM

## 2024-05-14 DIAGNOSIS — I10 BENIGN ESSENTIAL HYPERTENSION: ICD-10-CM

## 2024-05-14 DIAGNOSIS — F43.21 SITUATIONAL DEPRESSION: ICD-10-CM

## 2024-05-14 RX ORDER — CITALOPRAM HYDROBROMIDE 10 MG/1
TABLET ORAL
Qty: 90 TABLET | Refills: 0 | Status: SHIPPED | OUTPATIENT
Start: 2024-05-14 | End: 2024-07-30

## 2024-05-14 RX ORDER — ROSUVASTATIN CALCIUM 40 MG/1
40 TABLET, COATED ORAL DAILY
Qty: 90 TABLET | Refills: 0 | Status: SHIPPED | OUTPATIENT
Start: 2024-05-14 | End: 2024-07-30

## 2024-05-14 RX ORDER — POTASSIUM CHLORIDE 1500 MG/1
20 TABLET, EXTENDED RELEASE ORAL DAILY
Qty: 90 TABLET | Refills: 0 | Status: SHIPPED | OUTPATIENT
Start: 2024-05-14 | End: 2024-07-30

## 2024-05-14 RX ORDER — LISINOPRIL AND HYDROCHLOROTHIAZIDE 12.5; 2 MG/1; MG/1
2 TABLET ORAL DAILY
Qty: 180 TABLET | Refills: 0 | Status: SHIPPED | OUTPATIENT
Start: 2024-05-14 | End: 2024-07-30

## 2024-05-14 RX ORDER — CLONIDINE HYDROCHLORIDE 0.1 MG/1
0.1 TABLET ORAL 2 TIMES DAILY
Qty: 180 TABLET | Refills: 0 | Status: SHIPPED | OUTPATIENT
Start: 2024-05-14 | End: 2024-07-30

## 2024-05-16 NOTE — TELEPHONE ENCOUNTER
Please schedule in person follow up appointment with Dr Ruddy BURGESS to use same day slot   Refill completed.

## 2024-07-30 ENCOUNTER — ORDERS ONLY (AUTO-RELEASED) (OUTPATIENT)
Dept: FAMILY MEDICINE | Facility: CLINIC | Age: 59
End: 2024-07-30

## 2024-07-30 ENCOUNTER — OFFICE VISIT (OUTPATIENT)
Dept: FAMILY MEDICINE | Facility: CLINIC | Age: 59
End: 2024-07-30

## 2024-07-30 VITALS
BODY MASS INDEX: 27.32 KG/M2 | WEIGHT: 170 LBS | HEART RATE: 53 BPM | TEMPERATURE: 97.9 F | RESPIRATION RATE: 16 BRPM | DIASTOLIC BLOOD PRESSURE: 75 MMHG | OXYGEN SATURATION: 95 % | SYSTOLIC BLOOD PRESSURE: 127 MMHG | HEIGHT: 66 IN

## 2024-07-30 DIAGNOSIS — E78.5 HYPERLIPIDEMIA LDL GOAL <100: ICD-10-CM

## 2024-07-30 DIAGNOSIS — R73.01 IFG (IMPAIRED FASTING GLUCOSE): ICD-10-CM

## 2024-07-30 DIAGNOSIS — Z12.11 SCREEN FOR COLON CANCER: ICD-10-CM

## 2024-07-30 DIAGNOSIS — Z86.73 HISTORY OF ISCHEMIC STROKE: ICD-10-CM

## 2024-07-30 DIAGNOSIS — I10 BENIGN ESSENTIAL HYPERTENSION: Primary | ICD-10-CM

## 2024-07-30 DIAGNOSIS — M65.342 ACQUIRED TRIGGER FINGER OF LEFT RING FINGER: ICD-10-CM

## 2024-07-30 DIAGNOSIS — F43.21 SITUATIONAL DEPRESSION: ICD-10-CM

## 2024-07-30 DIAGNOSIS — Z23 ENCOUNTER FOR IMMUNIZATION: ICD-10-CM

## 2024-07-30 DIAGNOSIS — I45.10 RBBB (RIGHT BUNDLE BRANCH BLOCK): ICD-10-CM

## 2024-07-30 DIAGNOSIS — R12 HEART BURN: ICD-10-CM

## 2024-07-30 DIAGNOSIS — E87.6 HYPOKALEMIA: ICD-10-CM

## 2024-07-30 LAB
ALBUMIN SERPL BCG-MCNC: 4.5 G/DL (ref 3.5–5.2)
ALP SERPL-CCNC: 76 U/L (ref 40–150)
ALT SERPL W P-5'-P-CCNC: 24 U/L (ref 0–70)
ANION GAP SERPL CALCULATED.3IONS-SCNC: 10 MMOL/L (ref 7–15)
AST SERPL W P-5'-P-CCNC: 30 U/L (ref 0–45)
BILIRUB SERPL-MCNC: 0.4 MG/DL
BUN SERPL-MCNC: 9.5 MG/DL (ref 8–23)
CALCIUM SERPL-MCNC: 9.4 MG/DL (ref 8.8–10.4)
CHLORIDE SERPL-SCNC: 101 MMOL/L (ref 98–107)
CHOLEST SERPL-MCNC: 151 MG/DL
CREAT SERPL-MCNC: 0.74 MG/DL (ref 0.67–1.17)
EGFRCR SERPLBLD CKD-EPI 2021: >90 ML/MIN/1.73M2
ERYTHROCYTE [DISTWIDTH] IN BLOOD BY AUTOMATED COUNT: 12.1 % (ref 10–15)
FASTING STATUS PATIENT QL REPORTED: YES
FASTING STATUS PATIENT QL REPORTED: YES
GLUCOSE SERPL-MCNC: 105 MG/DL (ref 70–99)
HBA1C MFR BLD: 6.3 % (ref 0–5.6)
HCO3 SERPL-SCNC: 26 MMOL/L (ref 22–29)
HCT VFR BLD AUTO: 41.5 % (ref 40–53)
HDLC SERPL-MCNC: 45 MG/DL
HGB BLD-MCNC: 14.2 G/DL (ref 13.3–17.7)
LDLC SERPL CALC-MCNC: 82 MG/DL
MCH RBC QN AUTO: 31.1 PG (ref 26.5–33)
MCHC RBC AUTO-ENTMCNC: 34.2 G/DL (ref 31.5–36.5)
MCV RBC AUTO: 91 FL (ref 78–100)
NONHDLC SERPL-MCNC: 106 MG/DL
PLATELET # BLD AUTO: 264 10E3/UL (ref 150–450)
POTASSIUM SERPL-SCNC: 3.7 MMOL/L (ref 3.4–5.3)
PROT SERPL-MCNC: 7.2 G/DL (ref 6.4–8.3)
RBC # BLD AUTO: 4.57 10E6/UL (ref 4.4–5.9)
SODIUM SERPL-SCNC: 137 MMOL/L (ref 135–145)
TRIGL SERPL-MCNC: 122 MG/DL
WBC # BLD AUTO: 6.5 10E3/UL (ref 4–11)

## 2024-07-30 PROCEDURE — 83036 HEMOGLOBIN GLYCOSYLATED A1C: CPT | Performed by: INTERNAL MEDICINE

## 2024-07-30 PROCEDURE — 80061 LIPID PANEL: CPT | Performed by: INTERNAL MEDICINE

## 2024-07-30 PROCEDURE — 91320 SARSCV2 VAC 30MCG TRS-SUC IM: CPT | Performed by: INTERNAL MEDICINE

## 2024-07-30 PROCEDURE — 90677 PCV20 VACCINE IM: CPT | Performed by: INTERNAL MEDICINE

## 2024-07-30 PROCEDURE — 99214 OFFICE O/P EST MOD 30 MIN: CPT | Mod: 25 | Performed by: INTERNAL MEDICINE

## 2024-07-30 PROCEDURE — 90471 IMMUNIZATION ADMIN: CPT | Performed by: INTERNAL MEDICINE

## 2024-07-30 PROCEDURE — 90480 ADMN SARSCOV2 VAC 1/ONLY CMP: CPT | Performed by: INTERNAL MEDICINE

## 2024-07-30 PROCEDURE — 85027 COMPLETE CBC AUTOMATED: CPT | Performed by: INTERNAL MEDICINE

## 2024-07-30 PROCEDURE — 36415 COLL VENOUS BLD VENIPUNCTURE: CPT | Performed by: INTERNAL MEDICINE

## 2024-07-30 PROCEDURE — 80053 COMPREHEN METABOLIC PANEL: CPT | Performed by: INTERNAL MEDICINE

## 2024-07-30 RX ORDER — CLONIDINE HYDROCHLORIDE 0.1 MG/1
0.1 TABLET ORAL 2 TIMES DAILY
Qty: 180 TABLET | Refills: 3 | Status: SHIPPED | OUTPATIENT
Start: 2024-07-30

## 2024-07-30 RX ORDER — AMLODIPINE BESYLATE 10 MG/1
10 TABLET ORAL DAILY
Qty: 90 TABLET | Refills: 3 | Status: SHIPPED | OUTPATIENT
Start: 2024-07-30

## 2024-07-30 RX ORDER — CITALOPRAM HYDROBROMIDE 10 MG/1
TABLET ORAL
Qty: 90 TABLET | Refills: 3 | Status: SHIPPED | OUTPATIENT
Start: 2024-07-30

## 2024-07-30 RX ORDER — LISINOPRIL AND HYDROCHLOROTHIAZIDE 12.5; 2 MG/1; MG/1
2 TABLET ORAL DAILY
Qty: 180 TABLET | Refills: 3 | Status: SHIPPED | OUTPATIENT
Start: 2024-07-30

## 2024-07-30 RX ORDER — ROSUVASTATIN CALCIUM 40 MG/1
40 TABLET, COATED ORAL DAILY
Qty: 90 TABLET | Refills: 3 | Status: SHIPPED | OUTPATIENT
Start: 2024-07-30

## 2024-07-30 RX ORDER — POTASSIUM CHLORIDE 1500 MG/1
20 TABLET, EXTENDED RELEASE ORAL DAILY
Qty: 90 TABLET | Refills: 3 | Status: SHIPPED | OUTPATIENT
Start: 2024-07-30

## 2024-07-30 ASSESSMENT — PAIN SCALES - GENERAL: PAINLEVEL: NO PAIN (0)

## 2024-07-30 NOTE — PROGRESS NOTES
"  Assessment & Plan     1.  Essential hypertension.  Blood pressure now under good control.  Continue current and hypertensive medication.  2.  History of ischemic stroke involving the left pontine in 2019 with left hemiparesis.  Patient has completely recovered.  3.  Hyperlipidemia being treated with rosuvastatin 40 mg a day.  Will check lipids today and get back with results.  4.  Impaired fasting glucose.  Will be checking A1c today.  5.  Situational depression symptoms related to previous stroke in remission.  Continue with escitalopram.  6.  Hypokalemia being treated with potassium supplement.  Will be checking electrolytes today.  7.  Screening for colon cancer.  Discussed colonoscopy as well as Cologuard.  He agreed to Cologuard.  Orders placed.  8.  Acquired trigger finger left ring finger.  Refer to sports medicine.  9.  Heartburn for which she takes omeprazole.  Refill provided.  10.  Right bundle branch block.  11.  Encounter for immunization.  Pneumovax 20 and COVID booster vaccine provided today.    Patient had CBC, CMP, lipids A1c today.  I will get back to with results.  Advise follow-up with me for annual physical in 1 year.  Monitor blood pressure off-and-on at home.          BMI  Estimated body mass index is 27.44 kg/m  as calculated from the following:    Height as of this encounter: 1.676 m (5' 6\").    Weight as of this encounter: 77.1 kg (170 lb).         Cecil Marshall is a 59 year old, presenting for the following health issues:  Follow Up (Overall Health )      7/30/2024     7:04 AM   Additional Questions   Roomed by Christiano   Accompanied by self         7/30/2024     7:04 AM   Patient Reported Additional Medications   Patient reports taking the following new medications no     HPI     59-year-old gentleman with known history of hypertension, impaired fasting glucose, previous stroke, hyperlipidemia is coming for follow-up.  Overall he is doing well.  His only concern is trigger finger " "involving the left ring finger.  He otherwise has been doing great.  He is taking his medication as prescribed.        Review of Systems  Constitutional, HEENT, cardiovascular, pulmonary, GI, , musculoskeletal, neuro, skin, endocrine and psych systems are negative, except as otherwise noted.      Objective    /75 (BP Location: Right arm, Patient Position: Sitting, Cuff Size: Adult Regular)   Pulse 53   Temp 97.9  F (36.6  C) (Temporal)   Resp 16   Ht 1.676 m (5' 6\")   Wt 77.1 kg (170 lb)   SpO2 95%   BMI 27.44 kg/m    Body mass index is 27.44 kg/m .  Physical Exam   GENERAL: alert and no distress  EYES: Eyes grossly normal to inspection, PERRL and conjunctivae and sclerae normal  HENT: ear canals and TM's normal, nose and mouth without ulcers or lesions  NECK: no adenopathy, no asymmetry, masses, or scars  RESP: lungs clear to auscultation - no rales, rhonchi or wheezes  CV: regular rate and rhythm, normal S1 S2, no S3 or S4, no murmur, click or rub, no peripheral edema  ABDOMEN: soft, nontender, no hepatosplenomegaly, no masses and bowel sounds normal   (male): normal male genitalia without lesions or urethral discharge, no hernia  MS: no gross musculoskeletal defects noted, no edema  SKIN: no suspicious lesions or rashes  NEURO: Normal strength and tone, mentation intact and speech normal  PSYCH: mentation appears normal, affect normal/bright  LYMPH: no cervical, supraclavicular, axillary, or inguinal adenopathy            Signed Electronically by: Georges Fox MD    "

## 2024-07-30 NOTE — PROGRESS NOTES
Prior to immunization administration, verified patients identity using patient s name and date of birth. Please see Immunization Activity for additional information.     Screening Questionnaire for Adult Immunization    Are you sick today?   No   Do you have allergies to medications, food, a vaccine component or latex?   No   Have you ever had a serious reaction after receiving a vaccination?   No   Do you have a long-term health problem with heart, lung, kidney, or metabolic disease (e.g., diabetes), asthma, a blood disorder, no spleen, complement component deficiency, a cochlear implant, or a spinal fluid leak?  Are you on long-term aspirin therapy?   No   Do you have cancer, leukemia, HIV/AIDS, or any other immune system problem?   No   Do you have a parent, brother, or sister with an immune system problem?   No   In the past 3 months, have you taken medications that affect  your immune system, such as prednisone, other steroids, or anticancer drugs; drugs for the treatment of rheumatoid arthritis, Crohn s disease, or psoriasis; or have you had radiation treatments?   No   Have you had a seizure, or a brain or other nervous system problem?   No   During the past year, have you received a transfusion of blood or blood    products, or been given immune (gamma) globulin or antiviral drug?   No   For women: Are you pregnant or is there a chance you could become       pregnant during the next month?   No   Have you received any vaccinations in the past 4 weeks?   No     Immunization questionnaire answers were all negative.      Patient instructed to remain in clinic for 15 minutes afterwards, and to report any adverse reactions.     Screening performed by Jackie Osei on 7/30/2024 at 7:34 AM.

## 2024-07-30 NOTE — LETTER
July 31, 2024      Rolando Granados  [address]   [place] [address]        Dear ,    We are writing to inform you of your test results.    Your white count (WBC) and hemoglobin is within normal range.  The electrolytes are normal.  The kidney test (creatinine and BUN) is normal.  The liver test (alkaline phosphatase, AST, ALT, bilirubin and protein) is within normal range.  Your fasting blood sugar is in the prediabetes range at 105..  Hemoglobin A1c which measures average sugar the past 3 months is also in the prediabetes range at 6.3.  This shows that you have prediabetes.  Low carbohydrate diet is recommended.  We should recheck it at least in 1 year.  The cholesterol profile is very good.  The total cholesterol, the good cholesterol (HDL) and the bad cholesterol (LDL) are all within limits.  Please continue current treatment    Resulted Orders   CBC with platelets   Result Value Ref Range    WBC Count 6.5 4.0 - 11.0 10e3/uL    RBC Count 4.57 4.40 - 5.90 10e6/uL    Hemoglobin 14.2 13.3 - 17.7 g/dL    Hematocrit 41.5 40.0 - 53.0 %    MCV 91 78 - 100 fL    MCH 31.1 26.5 - 33.0 pg    MCHC 34.2 31.5 - 36.5 g/dL    RDW 12.1 10.0 - 15.0 %    Platelet Count 264 150 - 450 10e3/uL   Comprehensive metabolic panel   Result Value Ref Range    Sodium 137 135 - 145 mmol/L    Potassium 3.7 3.4 - 5.3 mmol/L    Carbon Dioxide (CO2) 26 22 - 29 mmol/L    Anion Gap 10 7 - 15 mmol/L    Urea Nitrogen 9.5 8.0 - 23.0 mg/dL    Creatinine 0.74 0.67 - 1.17 mg/dL    GFR Estimate >90 >60 mL/min/1.73m2      Comment:      eGFR calculated using 2021 CKD-EPI equation.    Calcium 9.4 8.8 - 10.4 mg/dL      Comment:      Reference intervals for this test were updated on 7/16/2024 to reflect our healthy population more accurately. There may be differences in the flagging of prior results with similar values performed with this method. Those prior results can be interpreted in the context of the updated reference intervals.     Chloride 101 98 - 107 mmol/L    Glucose 105 (H) 70 - 99 mg/dL    Alkaline Phosphatase 76 40 - 150 U/L    AST 30 0 - 45 U/L    ALT 24 0 - 70 U/L    Protein Total 7.2 6.4 - 8.3 g/dL    Albumin 4.5 3.5 - 5.2 g/dL    Bilirubin Total 0.4 <=1.2 mg/dL    Patient Fasting > 8hrs? Yes    Lipid Profile   Result Value Ref Range    Cholesterol 151 <200 mg/dL    Triglycerides 122 <150 mg/dL    Direct Measure HDL 45 >=40 mg/dL    LDL Cholesterol Calculated 82 <=100 mg/dL    Non HDL Cholesterol 106 <130 mg/dL    Patient Fasting > 8hrs? Yes     Narrative    Cholesterol  Desirable:  <200 mg/dL    Triglycerides  Normal:  Less than 150 mg/dL  Borderline High:  150-199 mg/dL  High:  200-499 mg/dL  Very High:  Greater than or equal to 500 mg/dL    Direct Measure HDL  Female:  Greater than or equal to 50 mg/dL   Male:  Greater than or equal to 40 mg/dL    LDL Cholesterol  Desirable:  <100mg/dL  Above Desirable:  100-129 mg/dL   Borderline High:  130-159 mg/dL   High:  160-189 mg/dL   Very High:  >= 190 mg/dL    Non HDL Cholesterol  Desirable:  130 mg/dL  Above Desirable:  130-159 mg/dL  Borderline High:  160-189 mg/dL  High:  190-219 mg/dL  Very High:  Greater than or equal to 220 mg/dL   Hemoglobin A1c   Result Value Ref Range    Hemoglobin A1C 6.3 (H) 0.0 - 5.6 %      Comment:      Normal <5.7%   Prediabetes 5.7-6.4%    Diabetes 6.5% or higher     Note: Adopted from ADA consensus guidelines.       If you have any questions or concerns, please call the clinic at the number listed above.       Sincerely,      Georges Fox MD

## 2024-09-22 ENCOUNTER — HEALTH MAINTENANCE LETTER (OUTPATIENT)
Age: 59
End: 2024-09-22

## 2024-11-18 ENCOUNTER — TELEPHONE (OUTPATIENT)
Dept: FAMILY MEDICINE | Facility: CLINIC | Age: 59
End: 2024-11-18

## 2024-11-18 ENCOUNTER — APPOINTMENT (OUTPATIENT)
Dept: INTERPRETER SERVICES | Facility: CLINIC | Age: 59
End: 2024-11-18

## 2024-11-18 DIAGNOSIS — K59.00 CONSTIPATION, UNSPECIFIED CONSTIPATION TYPE: ICD-10-CM

## 2024-11-18 DIAGNOSIS — R52 PAIN: ICD-10-CM

## 2024-11-18 DIAGNOSIS — I63.9 ACUTE ISCHEMIC STROKE (H): ICD-10-CM

## 2024-11-18 NOTE — TELEPHONE ENCOUNTER
I have not seen patient since 2020.  Will route to Dr. Fox who has been following him most recently

## 2024-11-18 NOTE — TELEPHONE ENCOUNTER
Nurse SBAR Triage    Situation: Pt calling (with ) to refill Rx's.    Background: States that he needs refills of all of his medications including the senekot, aspirin and tylenol. Already has a refill encounter from [facility] today. Pt also states that he needs his Losartan refilled, however pt does not have that medication on his med list at all. Says he does not see the prescribing provider listed on the bottle of medication he has.     Assessment: Pt had a 1 year supply of all of his medications sent in on 24. Should have refills remaining, but pt stated the pharmacy says he does not.    Recommendation: Advised pt that this writer will call the pharmacy to confirm that the will fill the requested medications with the refills on file. Will need to have Senekot Rx refilled by provider, as it is . Called pharmacy and they will fill the Rx's they have on file. Routing message to PCP for Senekot, aspirin and Tylenol refills and to ask if pt is to be on Losartan since he says he has the medication at home but it's not on his med list.    Carol Tsang, RN, BSN  [facility]

## 2024-11-19 RX ORDER — ASPIRIN 81 MG/1
81 TABLET ORAL DAILY
Qty: 90 TABLET | Refills: 3 | Status: SHIPPED | OUTPATIENT
Start: 2024-11-19

## 2024-11-19 RX ORDER — ACETAMINOPHEN 325 MG/1
325-650 TABLET ORAL EVERY 6 HOURS PRN
Qty: 100 TABLET | Refills: 4 | Status: SHIPPED | OUTPATIENT
Start: 2024-11-19

## 2024-11-19 RX ORDER — AMOXICILLIN 250 MG
1-2 CAPSULE ORAL DAILY PRN
Qty: 100 TABLET | Refills: 3 | Status: SHIPPED | OUTPATIENT
Start: 2024-11-19

## 2025-01-21 ENCOUNTER — OFFICE VISIT (OUTPATIENT)
Dept: FAMILY MEDICINE | Facility: CLINIC | Age: 60
End: 2025-01-21
Payer: COMMERCIAL

## 2025-01-21 VITALS
HEART RATE: 62 BPM | TEMPERATURE: 98.3 F | DIASTOLIC BLOOD PRESSURE: 84 MMHG | WEIGHT: 181.5 LBS | OXYGEN SATURATION: 97 % | SYSTOLIC BLOOD PRESSURE: 168 MMHG | RESPIRATION RATE: 15 BRPM | HEIGHT: 67 IN | BODY MASS INDEX: 28.49 KG/M2

## 2025-01-21 DIAGNOSIS — R73.01 IFG (IMPAIRED FASTING GLUCOSE): ICD-10-CM

## 2025-01-21 DIAGNOSIS — Z00.00 ANNUAL PHYSICAL EXAM: Primary | ICD-10-CM

## 2025-01-21 DIAGNOSIS — F43.21 SITUATIONAL DEPRESSION: ICD-10-CM

## 2025-01-21 DIAGNOSIS — R10.32 ABDOMINAL PAIN, LEFT LOWER QUADRANT: ICD-10-CM

## 2025-01-21 DIAGNOSIS — Z86.73 HISTORY OF ISCHEMIC STROKE: ICD-10-CM

## 2025-01-21 DIAGNOSIS — E78.5 HYPERLIPIDEMIA LDL GOAL <100: ICD-10-CM

## 2025-01-21 DIAGNOSIS — I10 BENIGN ESSENTIAL HYPERTENSION: ICD-10-CM

## 2025-01-21 DIAGNOSIS — K21.9 GASTROESOPHAGEAL REFLUX DISEASE WITHOUT ESOPHAGITIS: ICD-10-CM

## 2025-01-21 DIAGNOSIS — Z23 ENCOUNTER FOR IMMUNIZATION: ICD-10-CM

## 2025-01-21 DIAGNOSIS — Z12.5 SCREENING FOR PROSTATE CANCER: ICD-10-CM

## 2025-01-21 LAB
ALBUMIN SERPL BCG-MCNC: 4.4 G/DL (ref 3.5–5.2)
ALBUMIN UR-MCNC: NEGATIVE MG/DL
ALP SERPL-CCNC: 80 U/L (ref 40–150)
ALT SERPL W P-5'-P-CCNC: 23 U/L (ref 0–70)
ANION GAP SERPL CALCULATED.3IONS-SCNC: 8 MMOL/L (ref 7–15)
APPEARANCE UR: CLEAR
AST SERPL W P-5'-P-CCNC: 22 U/L (ref 0–45)
BASOPHILS # BLD AUTO: 0.1 10E3/UL (ref 0–0.2)
BASOPHILS NFR BLD AUTO: 1 %
BILIRUB SERPL-MCNC: 0.3 MG/DL
BILIRUB UR QL STRIP: NEGATIVE
BUN SERPL-MCNC: 7.7 MG/DL (ref 8–23)
CALCIUM SERPL-MCNC: 9 MG/DL (ref 8.8–10.4)
CHLORIDE SERPL-SCNC: 104 MMOL/L (ref 98–107)
CHOLEST SERPL-MCNC: 172 MG/DL
COLOR UR AUTO: YELLOW
CREAT SERPL-MCNC: 0.76 MG/DL (ref 0.67–1.17)
CREAT UR-MCNC: 118 MG/DL
CRP SERPL-MCNC: <3 MG/L
EGFRCR SERPLBLD CKD-EPI 2021: >90 ML/MIN/1.73M2
EOSINOPHIL # BLD AUTO: 0.1 10E3/UL (ref 0–0.7)
EOSINOPHIL NFR BLD AUTO: 2 %
ERYTHROCYTE [DISTWIDTH] IN BLOOD BY AUTOMATED COUNT: 12.4 % (ref 10–15)
EST. AVERAGE GLUCOSE BLD GHB EST-MCNC: 134 MG/DL
FASTING STATUS PATIENT QL REPORTED: YES
FASTING STATUS PATIENT QL REPORTED: YES
GLUCOSE SERPL-MCNC: 109 MG/DL (ref 70–99)
GLUCOSE UR STRIP-MCNC: NEGATIVE MG/DL
HBA1C MFR BLD: 6.3 % (ref 0–5.6)
HCO3 SERPL-SCNC: 27 MMOL/L (ref 22–29)
HCT VFR BLD AUTO: 44.3 % (ref 40–53)
HDLC SERPL-MCNC: 57 MG/DL
HGB BLD-MCNC: 14.9 G/DL (ref 13.3–17.7)
HGB UR QL STRIP: NEGATIVE
IMM GRANULOCYTES # BLD: 0 10E3/UL
IMM GRANULOCYTES NFR BLD: 0 %
KETONES UR STRIP-MCNC: NEGATIVE MG/DL
LDLC SERPL CALC-MCNC: 84 MG/DL
LEUKOCYTE ESTERASE UR QL STRIP: NEGATIVE
LYMPHOCYTES # BLD AUTO: 2.2 10E3/UL (ref 0.8–5.3)
LYMPHOCYTES NFR BLD AUTO: 36 %
MCH RBC QN AUTO: 31.1 PG (ref 26.5–33)
MCHC RBC AUTO-ENTMCNC: 33.6 G/DL (ref 31.5–36.5)
MCV RBC AUTO: 93 FL (ref 78–100)
MICROALBUMIN UR-MCNC: <12 MG/L
MICROALBUMIN/CREAT UR: NORMAL MG/G{CREAT}
MONOCYTES # BLD AUTO: 0.5 10E3/UL (ref 0–1.3)
MONOCYTES NFR BLD AUTO: 8 %
NEUTROPHILS # BLD AUTO: 3.2 10E3/UL (ref 1.6–8.3)
NEUTROPHILS NFR BLD AUTO: 52 %
NITRATE UR QL: NEGATIVE
NONHDLC SERPL-MCNC: 115 MG/DL
PH UR STRIP: 6 [PH] (ref 5–7)
PLATELET # BLD AUTO: 261 10E3/UL (ref 150–450)
POTASSIUM SERPL-SCNC: 4.2 MMOL/L (ref 3.4–5.3)
PROT SERPL-MCNC: 7 G/DL (ref 6.4–8.3)
PSA SERPL DL<=0.01 NG/ML-MCNC: 0.77 NG/ML (ref 0–4.5)
RBC # BLD AUTO: 4.79 10E6/UL (ref 4.4–5.9)
RBC #/AREA URNS AUTO: ABNORMAL /HPF
SODIUM SERPL-SCNC: 139 MMOL/L (ref 135–145)
SP GR UR STRIP: 1.02 (ref 1–1.03)
SQUAMOUS #/AREA URNS AUTO: ABNORMAL /LPF
TRIGL SERPL-MCNC: 157 MG/DL
UROBILINOGEN UR STRIP-ACNC: 0.2 E.U./DL
WBC # BLD AUTO: 6 10E3/UL (ref 4–11)
WBC #/AREA URNS AUTO: ABNORMAL /HPF

## 2025-01-21 PROCEDURE — 85025 COMPLETE CBC W/AUTO DIFF WBC: CPT | Performed by: INTERNAL MEDICINE

## 2025-01-21 PROCEDURE — 91320 SARSCV2 VAC 30MCG TRS-SUC IM: CPT | Performed by: INTERNAL MEDICINE

## 2025-01-21 PROCEDURE — 90480 ADMN SARSCOV2 VAC 1/ONLY CMP: CPT | Performed by: INTERNAL MEDICINE

## 2025-01-21 PROCEDURE — 80061 LIPID PANEL: CPT | Performed by: INTERNAL MEDICINE

## 2025-01-21 PROCEDURE — G0103 PSA SCREENING: HCPCS | Performed by: INTERNAL MEDICINE

## 2025-01-21 PROCEDURE — 80053 COMPREHEN METABOLIC PANEL: CPT | Performed by: INTERNAL MEDICINE

## 2025-01-21 PROCEDURE — 82043 UR ALBUMIN QUANTITATIVE: CPT | Performed by: INTERNAL MEDICINE

## 2025-01-21 PROCEDURE — 90471 IMMUNIZATION ADMIN: CPT | Performed by: INTERNAL MEDICINE

## 2025-01-21 PROCEDURE — G2211 COMPLEX E/M VISIT ADD ON: HCPCS | Performed by: INTERNAL MEDICINE

## 2025-01-21 PROCEDURE — 99396 PREV VISIT EST AGE 40-64: CPT | Mod: 25 | Performed by: INTERNAL MEDICINE

## 2025-01-21 PROCEDURE — 81001 URINALYSIS AUTO W/SCOPE: CPT | Performed by: INTERNAL MEDICINE

## 2025-01-21 PROCEDURE — 90673 RIV3 VACCINE NO PRESERV IM: CPT | Performed by: INTERNAL MEDICINE

## 2025-01-21 PROCEDURE — 83036 HEMOGLOBIN GLYCOSYLATED A1C: CPT | Performed by: INTERNAL MEDICINE

## 2025-01-21 PROCEDURE — 99214 OFFICE O/P EST MOD 30 MIN: CPT | Mod: 25 | Performed by: INTERNAL MEDICINE

## 2025-01-21 PROCEDURE — 36415 COLL VENOUS BLD VENIPUNCTURE: CPT | Performed by: INTERNAL MEDICINE

## 2025-01-21 PROCEDURE — 86140 C-REACTIVE PROTEIN: CPT | Performed by: INTERNAL MEDICINE

## 2025-01-21 PROCEDURE — 82570 ASSAY OF URINE CREATININE: CPT | Performed by: INTERNAL MEDICINE

## 2025-01-21 RX ORDER — SPIRONOLACTONE 25 MG/1
25 TABLET ORAL DAILY
Qty: 90 TABLET | Refills: 0 | Status: SHIPPED | OUTPATIENT
Start: 2025-01-21

## 2025-01-21 SDOH — HEALTH STABILITY: PHYSICAL HEALTH: ON AVERAGE, HOW MANY MINUTES DO YOU ENGAGE IN EXERCISE AT THIS LEVEL?: 30 MIN

## 2025-01-21 SDOH — HEALTH STABILITY: PHYSICAL HEALTH: ON AVERAGE, HOW MANY DAYS PER WEEK DO YOU ENGAGE IN MODERATE TO STRENUOUS EXERCISE (LIKE A BRISK WALK)?: 2 DAYS

## 2025-01-21 ASSESSMENT — PAIN SCALES - GENERAL
PAINLEVEL_OUTOF10: NO PAIN (0)
PAINLEVEL_OUTOF10: NO PAIN (0)

## 2025-01-21 ASSESSMENT — SOCIAL DETERMINANTS OF HEALTH (SDOH): HOW OFTEN DO YOU GET TOGETHER WITH FRIENDS OR RELATIVES?: ONCE A WEEK

## 2025-01-21 NOTE — LETTER
January 21, 2025      Rolando Granados  [address]   [place] [address]        Dear ,    We are writing to inform you of your test results.    Electrolytes are within normal range.  Kidney test (creatinine BUN) is in good range.  Liver test (alkaline phosphatase, AST, ALT, bilirubin and protein) is normal.  Fasting blood sugar is mildly elevated 109 and hemoglobin A1c, which measures the average sugar over the past 3 months, is also mildly elevated in the prediabetes range at 6.3.  This clearly shows that you have prediabetes and a low carbohydrate diet is recommended.    The urine test is clear and urine microalbumin test is normal.  PSA, a marker of prostate cancer is within normal range.  CRP a marker of inflammation is within normal range.  The cholesterol profile is good.  The total cholesterol, the good cholesterol (HDL) and the bad cholesterol (LDL) are within normal range.  Your white count and hemoglobin is normal.    Resulted Orders   Comprehensive metabolic panel   Result Value Ref Range    Sodium 139 135 - 145 mmol/L    Potassium 4.2 3.4 - 5.3 mmol/L    Carbon Dioxide (CO2) 27 22 - 29 mmol/L    Anion Gap 8 7 - 15 mmol/L    Urea Nitrogen 7.7 (L) 8.0 - 23.0 mg/dL    Creatinine 0.76 0.67 - 1.17 mg/dL    GFR Estimate >90 >60 mL/min/1.73m2      Comment:      eGFR calculated using 2021 CKD-EPI equation.    Calcium 9.0 8.8 - 10.4 mg/dL      Comment:      Reference intervals for this test were updated on 7/16/2024 to reflect our healthy population more accurately. There may be differences in the flagging of prior results with similar values performed with this method. Those prior results can be interpreted in the context of the updated reference intervals.    Chloride 104 98 - 107 mmol/L    Glucose 109 (H) 70 - 99 mg/dL    Alkaline Phosphatase 80 40 - 150 U/L    AST 22 0 - 45 U/L    ALT 23 0 - 70 U/L    Protein Total 7.0 6.4 - 8.3 g/dL    Albumin 4.4 3.5 - 5.2 g/dL    Bilirubin Total 0.3  <=1.2 mg/dL    Patient Fasting > 8hrs? Yes    CRP inflammation   Result Value Ref Range    CRP Inflammation <3.00 <5.00 mg/L   Hemoglobin A1c   Result Value Ref Range    Estimated Average Glucose 134 (H) <117 mg/dL    Hemoglobin A1C 6.3 (H) 0.0 - 5.6 %      Comment:      Normal <5.7%   Prediabetes 5.7-6.4%    Diabetes 6.5% or higher     Note: Adopted from ADA consensus guidelines.   Albumin Random Urine Quantitative with Creat Ratio   Result Value Ref Range    Creatinine Urine mg/dL 118.0 mg/dL      Comment:      The reference ranges have not been established in urine creatinine. The results should be integrated into the clinical context for interpretation.    Albumin Urine mg/L <12.0 mg/L      Comment:      The reference ranges have not been established in urine albumin. The results should be integrated into the clinical context for interpretation.    Albumin Urine mg/g Cr        Comment:      Unable to calculate, urine albumin and/or urine creatinine is outside detectable limits.  Microalbuminuria is defined as an albumin:creatinine ratio of 17 to 299 for males and 25 to 299 for females. A ratio of albumin:creatinine of 300 or higher is indicative of overt proteinuria.  Due to biologic variability, positive results should be confirmed by a second, first-morning random or 24-hour timed urine specimen. If there is discrepancy, a third specimen is recommended. When 2 out of 3 results are in the microalbuminuria range, this is evidence for incipient nephropathy and warrants increased efforts at glucose control, blood pressure control, and institution of therapy with an angiotensin-converting-enzyme (ACE) inhibitor (if the patient can tolerate it).     Prostate Specific Antigen Screen   Result Value Ref Range    Prostate Specific Antigen Screen 0.77 0.00 - 4.50 ng/mL    Narrative    This result is obtained using the Roche Elecsys total PSA method on the kennedy [id number] immunoassay analyzer, which is an ultrasensitive  method. Results obtained with different assay methods or kits cannot be used interchangeably.  This test is intended for initial prostate cancer screening. PSA values exceeding the age-specific limits are suspicious for prostate disease, but additional testing, such as prostate biopsy, is needed to diagnose prostate pathology. The American Cancer Society recommends annual examination with digital rectal examination and serum PSA beginning at age 50 and for men with a life expectancy of at least 10 years after detection of prostate cancer. For men in high-risk groups, such as  Americans or men with a first-degree relative diagnosed at a younger age, testing should begin at a younger age. It is generally recommended that information be provided to patients about the benefits and limitations of testing and treatment so they can make informed decisions.   Lipid panel reflex to direct LDL Fasting   Result Value Ref Range    Cholesterol 172 <200 mg/dL    Triglycerides 157 (H) <150 mg/dL    Direct Measure HDL 57 >=40 mg/dL    LDL Cholesterol Calculated 84 <100 mg/dL    Non HDL Cholesterol 115 <130 mg/dL    Patient Fasting > 8hrs? Yes     Narrative    Cholesterol  Desirable: < 200 mg/dL  Borderline High: 200 - 239 mg/dL  High: >= 240 mg/dL    Triglycerides  Normal: < 150 mg/dL  Borderline High: 150 - 199 mg/dL  High: 200-499 mg/dL  Very High: >= 500 mg/dL    Direct Measure HDL  Female: >= 50 mg/dL   Male: >= 40 mg/dL    LDL Cholesterol  Desirable: < 100 mg/dL  Above Desirable: 100 - 129 mg/dL   Borderline High: 130 - 159 mg/dL   High:  160 - 189 mg/dL   Very High: >= 190 mg/dL    Non HDL Cholesterol  Desirable: < 130 mg/dL  Above Desirable: 130 - 159 mg/dL  Borderline High: 160 - 189 mg/dL  High: 190 - 219 mg/dL  Very High: >= 220 mg/dL   UA with Microscopic reflex to Culture   Result Value Ref Range    Color Urine Yellow Colorless, Straw, Light Yellow, Yellow    Appearance Urine Clear Clear    Glucose Urine Negative  Negative mg/dL    Bilirubin Urine Negative Negative    Ketones Urine Negative Negative mg/dL    Specific Gravity Urine 1.020 1.003 - 1.035    Blood Urine Negative Negative    pH Urine 6.0 5.0 - 7.0    Protein Albumin Urine Negative Negative mg/dL    Urobilinogen Urine 0.2 0.2, 1.0 E.U./dL    Nitrite Urine Negative Negative    Leukocyte Esterase Urine Negative Negative   CBC with platelets and differential   Result Value Ref Range    WBC Count 6.0 4.0 - 11.0 10e3/uL    RBC Count 4.79 4.40 - 5.90 10e6/uL    Hemoglobin 14.9 13.3 - 17.7 g/dL    Hematocrit 44.3 40.0 - 53.0 %    MCV 93 78 - 100 fL    MCH 31.1 26.5 - 33.0 pg    MCHC 33.6 31.5 - 36.5 g/dL    RDW 12.4 10.0 - 15.0 %    Platelet Count 261 150 - 450 10e3/uL    % Neutrophils 52 %    % Lymphocytes 36 %    % Monocytes 8 %    % Eosinophils 2 %    % Basophils 1 %    % Immature Granulocytes 0 %    Absolute Neutrophils 3.2 1.6 - 8.3 10e3/uL    Absolute Lymphocytes 2.2 0.8 - 5.3 10e3/uL    Absolute Monocytes 0.5 0.0 - 1.3 10e3/uL    Absolute Eosinophils 0.1 0.0 - 0.7 10e3/uL    Absolute Basophils 0.1 0.0 - 0.2 10e3/uL    Absolute Immature Granulocytes 0.0 <=0.4 10e3/uL   UA Microscopic with Reflex to Culture   Result Value Ref Range    RBC Urine 0-2 0-2 /HPF /HPF    WBC Urine 0-5 0-5 /HPF /HPF    Squamous Epithelials Urine Few (A) None Seen /LPF    Narrative    Urine Culture not indicated       If you have any questions or concerns, please call the clinic at the number listed above.       Sincerely,      Georges Fox MD    Electronically signed

## 2025-01-21 NOTE — PROGRESS NOTES
Prior to immunization administration, verified patients identity using patient s name and date of birth. Please see Immunization Activity for additional information.     Screening Questionnaire for Adult Immunization    Are you sick today?   No   Do you have allergies to medications, food, a vaccine component or latex?   No   Have you ever had a serious reaction after receiving a vaccination?   No   Do you have a long-term health problem with heart, lung, kidney, or metabolic disease (e.g., diabetes), asthma, a blood disorder, no spleen, complement component deficiency, a cochlear implant, or a spinal fluid leak?  Are you on long-term aspirin therapy?   No   Do you have cancer, leukemia, HIV/AIDS, or any other immune system problem?   No   Do you have a parent, brother, or sister with an immune system problem?   No   In the past 3 months, have you taken medications that affect  your immune system, such as prednisone, other steroids, or anticancer drugs; drugs for the treatment of rheumatoid arthritis, Crohn s disease, or psoriasis; or have you had radiation treatments?   No   Have you had a seizure, or a brain or other nervous system problem?   No   During the past year, have you received a transfusion of blood or blood    products, or been given immune (gamma) globulin or antiviral drug?   No   For women: Are you pregnant or is there a chance you could become       pregnant during the next month?   No   Have you received any vaccinations in the past 4 weeks?   No     Immunization questionnaire answers were all negative.      Patient instructed to remain in clinic for 15 minutes afterwards, and to report any adverse reactions.     Screening performed by Candida Haskins MA on 1/21/2025 at 8:34 AM.

## 2025-01-21 NOTE — PROGRESS NOTES
Preventive Care Visit  [facility]  Georges Fox MD, Internal Medicine  Jan 21, 2025      Assessment & Plan     1.  Annual physical exam completed and overall good  2.  Essential hypertension with blood pressure elevated in the clinic though he claims at home its between 130-140 systolic and diastolic in the 70s.  I told him to monitor blood pressure at home and bring the readings with him when he returns for follow-up in 4 weeks.  He is currently on amlodipine 10 mg a day clonidine 0.2 mg twice a day and losartan HCT 40/25.  I decided to add spironolactone 25 mg a day.  He will discontinue potassium 20 mg he currently takes.  He will return in 4 weeks at which time we will check BMP and his blood pressure.  3.  History of ischemic stroke involving the right pontine in 2019 with left hemiparesis.  He fully recovered.  4.  Hyperlipidemia been treated with rosuvastatin 40 mg a day.  I will be checking lipids today.  5.  Impaired fasting glucose.  I will be checking A1c today.  6.  Left lower quadrant pain has had it for few years but worse the last month or two.  Symptoms seem more positional but there is tenderness on palpation of the left lower quadrant.  Besides lab work we will proceed with CT scan of the abdomen and pelvis with and without contrast.  If it is negative will recommend colonoscopy exam.  He is due for one anyway.  7.  Situational depression under very good control in remission with citalopram 10 mg a day.  8.  Encounter for immunization.  COVID and flu vaccine provided today.  9.  Gastroesophageal reflux symptoms for which she takes omeprazole.  Under control.  10.  Screening for prostate cancer by checking PSA.    I will be checking UA, CBC, CMP, CRP, A1c, lipids and PSA.  I will get back with the results.  Patient is asked to return in 4 weeks and at that time we will check BMP.  Patient has been advised of split billing requirements and indicates understanding:  "Yes        BMI  Estimated body mass index is 28.83 kg/m  as calculated from the following:    Height as of this encounter: 1.69 m (5' 6.54\").    Weight as of this encounter: 82.3 kg (181 lb 8 oz).   Weight management plan: Discussed healthy diet and exercise guidelines    Counseling  Appropriate preventive services were addressed with this patient via screening, questionnaire, or discussion as appropriate for fall prevention, nutrition, physical activity, Tobacco-use cessation, social engagement, weight loss and cognition.  Checklist reviewing preventive services available has been given to the patient.  Reviewed patient's diet, addressing concerns and/or questions.   He is at risk for lack of exercise and has been provided with information to increase physical activity for the benefit of his well-being.   The patient was instructed to see the dentist every 6 months.       Cecil Marshall is a 59 year old, presenting for the following:  Physical        1/21/2025     7:03 AM   Additional Questions   Roomed by Lashonda   Accompanied by self          HPI    59-year-old gentleman with known history of hypertension as well as hypertensive ischemic stroke in 2019 from which he recovered completely as well as impaired fasting glucose and hyperlipidemia has come in for annual physical examination and follow-up of chronic health issues.  He mentions that in the past few years he has had intermittent left lower quadrant pain.  In the past 2 months has been more noticeable in persistent.  Only notices when he lays on the left side.  He will send switch to right side and it goes away.  No change in bowel habits.  No urogenital symptoms.  No fever or chills.  Last year colon cancer screening was offered including colonoscopy and Cologuard.  He had opted for Cologuard but that never got completed.  Denies change in bowel habits or bleeding per rectum.  Indicates that his blood pressure at home when he is taken it has been good " 130-140 over 70s.      Health Care Directive  Patient does not have a Health Care Directive: Patient states has Advance Directive and will bring in a copy to clinic.      1/21/2025   General Health   How would you rate your overall physical health? Good   Feel stress (tense, anxious, or unable to sleep) Not at all         1/21/2025   Nutrition   Three or more servings of calcium each day? Yes   Diet: Regular (no restrictions)   How many servings of fruit and vegetables per day? (!) 0-1   How many sweetened beverages each day? 0-1         1/21/2025   Exercise   Days per week of moderate/strenous exercise 2 days   Average minutes spent exercising at this level 30 min   (!) EXERCISE CONCERN      1/21/2025   Social Factors   Frequency of gathering with friends or relatives Once a week   Worry food won't last until get money to buy more No   Food not last or not have enough money for food? No   Do you have housing? (Housing is defined as stable permanent housing and does not include staying ouside in a car, in a tent, in an abandoned building, in an overnight shelter, or couch-surfing.) Yes   Are you worried about losing your housing? No   Lack of transportation? No   Unable to get utilities (heat,electricity)? No         1/21/2025   Fall Risk   Fallen 2 or more times in the past year? No    Trouble with walking or balance? No        Proxy-reported          1/21/2025   Dental   Dentist two times every year? (!) NO         1/21/2025   TB Screening   Were you born outside of the [place]? Yes         Today's PHQ-2 Score:       1/21/2025     7:16 AM   PHQ-2 ( 1999 [employer])   Q1: Little interest or pleasure in doing things 0    Q2: Feeling down, depressed or hopeless 0    PHQ-2 Score 0    Q1: Little interest or pleasure in doing things Not at all   Q2: Feeling down, depressed or hopeless Not at all   PHQ-2 Score 0       Proxy-reported           1/21/2025   Substance Use   Alcohol more than 3/day or more than 7/wk No   Do you use  "any other substances recreationally? No     Social History     Tobacco Use    Smoking status: Former     Current packs/day: 0.25     Average packs/day: 0.3 packs/day for 20.0 years (5.0 ttl pk-yrs)     Types: Cigarettes     Passive exposure: Past    Smokeless tobacco: Never    Tobacco comments:     5-6 cig per day for 20 years. quit 2019.    Vaping Use    Vaping status: Never Used   Substance Use Topics    Alcohol use: Not Currently    Drug use: Never           1/21/2025   STI Screening   New sexual partner(s) since last STI/HIV test? No   Last PSA:   Prostate Specific Antigen Screen   Date Value Ref Range Status   08/26/2021 0.35 0.00 - 4.00 ug/L Final     ASCVD Risk   The ASCVD Risk score (Chan MADDEN, et al., 2019) failed to calculate for the following reasons:    Risk score cannot be calculated because patient has a medical history suggesting prior/existing ASCVD           Reviewed and updated as needed this visit by Provider                      Review of Systems  Constitutional, HEENT, cardiovascular, pulmonary, GI, , musculoskeletal, neuro, skin, endocrine and psych systems are negative, except as otherwise noted.     Objective    Exam  BP (!) 178/82 (BP Location: Right arm, Patient Position: Sitting, Cuff Size: Adult Large)   Pulse 62   Temp 98.3  F (36.8  C) (Oral)   Resp 15   Ht 1.69 m (5' 6.54\")   Wt 82.3 kg (181 lb 8 oz)   SpO2 97%   BMI 28.83 kg/m     Estimated body mass index is 28.83 kg/m  as calculated from the following:    Height as of this encounter: 1.69 m (5' 6.54\").    Weight as of this encounter: 82.3 kg (181 lb 8 oz).    Physical Exam  GENERAL: alert and no distress  EYES: Eyes grossly normal to inspection, PERRL and conjunctivae and sclerae normal  HENT: ear canals and TM's normal, nose and mouth without ulcers or lesions  NECK: no adenopathy, no asymmetry, masses, or scars  RESP: lungs clear to auscultation - no rales, rhonchi or wheezes  CV: regular rate and rhythm, normal " S1 S2, no S3 or S4, no murmur, click or rub, no peripheral edema  ABDOMEN: tenderness LUQ and bowel sounds normal. N0 masses or organomegaly.   (male): normal male genitalia without lesions or urethral discharge, no hernia  RECTAL: normal sphincter tone, no rectal masses and prostate 1+ enlarged, nontender  MS: no gross musculoskeletal defects noted, no edema  SKIN: no suspicious lesions or rashes  NEURO: Normal strength and tone, mentation intact and speech normal  PSYCH: mentation appears normal, affect normal/bright  LYMPH: no cervical, supraclavicular, axillary, or inguinal adenopathy  Diabetic foot exam: normal DP and PT pulses, no trophic changes or ulcerative lesions, and normal sensory exam        Signed Electronically by: Georges Fox MD

## 2025-02-27 ENCOUNTER — OFFICE VISIT (OUTPATIENT)
Dept: FAMILY MEDICINE | Facility: CLINIC | Age: 60
End: 2025-02-27
Payer: COMMERCIAL

## 2025-02-27 VITALS
HEIGHT: 67 IN | TEMPERATURE: 97.7 F | WEIGHT: 176.5 LBS | BODY MASS INDEX: 27.7 KG/M2 | OXYGEN SATURATION: 97 % | HEART RATE: 72 BPM | SYSTOLIC BLOOD PRESSURE: 119 MMHG | RESPIRATION RATE: 16 BRPM | DIASTOLIC BLOOD PRESSURE: 71 MMHG

## 2025-02-27 DIAGNOSIS — I10 BENIGN ESSENTIAL HYPERTENSION: Primary | ICD-10-CM

## 2025-02-27 DIAGNOSIS — Z86.73 HISTORY OF ISCHEMIC STROKE: ICD-10-CM

## 2025-02-27 DIAGNOSIS — M25.562 ACUTE PAIN OF LEFT KNEE: ICD-10-CM

## 2025-02-27 LAB
ANION GAP SERPL CALCULATED.3IONS-SCNC: 12 MMOL/L (ref 7–15)
BUN SERPL-MCNC: 14.8 MG/DL (ref 8–23)
CALCIUM SERPL-MCNC: 10.6 MG/DL (ref 8.8–10.4)
CHLORIDE SERPL-SCNC: 96 MMOL/L (ref 98–107)
CREAT SERPL-MCNC: 0.77 MG/DL (ref 0.67–1.17)
EGFRCR SERPLBLD CKD-EPI 2021: >90 ML/MIN/1.73M2
GLUCOSE SERPL-MCNC: 110 MG/DL (ref 70–99)
HCO3 SERPL-SCNC: 24 MMOL/L (ref 22–29)
POTASSIUM SERPL-SCNC: 4.3 MMOL/L (ref 3.4–5.3)
SODIUM SERPL-SCNC: 132 MMOL/L (ref 135–145)

## 2025-02-27 RX ORDER — MELOXICAM 15 MG/1
15 TABLET ORAL DAILY
Qty: 15 TABLET | Refills: 0 | Status: SHIPPED | OUTPATIENT
Start: 2025-02-27 | End: 2025-03-14

## 2025-02-27 ASSESSMENT — PAIN SCALES - GENERAL: PAINLEVEL_OUTOF10: MODERATE PAIN (5)

## 2025-02-27 NOTE — PROGRESS NOTES
Assessment & Plan     1.  Benign essential hypertension.  With addition of spironolactone, the blood pressure is now under excellent control.  Continue with amlodipine, clonidine, lisinopril HCT and spironolactone.  Follow-up in July at which point I may wean off clonidine if his blood pressure remains good.  2.  History of ischemic stroke has recovered well.  3.  Acute pain of the left knee x 4 days.  X-ray reviewed with patient.  He looks normal.  Joint space well-maintained.  He could have a medial meniscal issue.  I recommend a short trial of anti-inflammatory medication.  Meloxicam 15 mg daily for 15 days prescribed.  Inform long-term use of nonsteroidal anti-inflammatory medication is not appropriate in his case due to hypertension.        Cecil Marshall is a 59 year old, presenting for the following health issues:  Hypertension    History of Present Illness       Hypertension: He presents for follow up of hypertension.  He does check blood pressure  regularly outside of the clinic. Outside blood pressures have been over 140/90. He does not follow a low salt diet.     He eats 2-3 servings of fruits and vegetables daily.He consumes 2 sweetened beverage(s) daily.He exercises with enough effort to increase his heart rate 10 to 19 minutes per day.  He exercises with enough effort to increase his heart rate 5 days per week. He is missing 1 dose(s) of medications per week.  He is not taking prescribed medications regularly due to side effects.     59-year-old with known history of hypertension,.  Ischemic stroke related to hypertension, hyperlipidemia, impaired fasting glucose has come in regarding hypertension follow-up.  He is tolerating spironolactone well.  Blood pressure readings at home have been good.  No chest pain or shortness of breath.  For the past 3 to 4 days he has been having left knee pain.  No direct injury.  Discomfort with ambulation and some stiffness after sitting for a while.  He has  "similar issue a year ago that it went away.      Review of Systems  Constitutional, neuro, ENT, endocrine, pulmonary, cardiac, gastrointestinal, genitourinary, musculoskeletal, integument and psychiatric systems are negative, except as otherwise noted.      Objective    /71 (BP Location: Right arm, Patient Position: Sitting, Cuff Size: Adult Regular)   Pulse 72   Temp 97.7  F (36.5  C) (Tympanic)   Resp 16   Ht 1.695 m (5' 6.75\")   Wt 80.1 kg (176 lb 8 oz)   SpO2 97%   BMI 27.85 kg/m    Body mass index is 27.85 kg/m .  Physical Exam   GENERAL: alert and no distress  RESP: lungs clear to auscultation - no rales, rhonchi or wheezes  CV: regular rate and rhythm, normal S1 S2, no S3 or S4, no murmur, click or rub, no peripheral edema  MS: Left knee examination reveals no swelling or redness.  There is tenderness on palpation of the medial joint space.  No subluxation.  Passive range of motion is good.    Xray - Reviewed and interpreted by me.  X-ray of the knee reviewed with patient.  It looks normal to me.        Signed Electronically by: Georges Fox MD    "

## 2025-04-10 ENCOUNTER — OFFICE VISIT (OUTPATIENT)
Dept: FAMILY MEDICINE | Facility: CLINIC | Age: 60
End: 2025-04-10
Payer: COMMERCIAL

## 2025-04-10 VITALS
DIASTOLIC BLOOD PRESSURE: 64 MMHG | HEIGHT: 67 IN | RESPIRATION RATE: 14 BRPM | OXYGEN SATURATION: 98 % | SYSTOLIC BLOOD PRESSURE: 114 MMHG | HEART RATE: 59 BPM | BODY MASS INDEX: 27.94 KG/M2 | TEMPERATURE: 97.5 F | WEIGHT: 178 LBS

## 2025-04-10 DIAGNOSIS — M25.562 LEFT KNEE PAIN, UNSPECIFIED CHRONICITY: Primary | ICD-10-CM

## 2025-04-10 ASSESSMENT — PAIN SCALES - GENERAL: PAINLEVEL_OUTOF10: SEVERE PAIN (10)

## 2025-04-10 NOTE — PROGRESS NOTES
"  {PROVIDER CHARTING PREFERENCE:485444}    Cecil Marshall is a 60 year old, presenting for the following health issues:  Knee Pain      4/10/2025     1:00 PM   Additional Questions   Roomed by Ileanaua   Accompanied by self         4/10/2025     1:00 PM   Patient Reported Additional Medications   Patient reports taking the following new medications no     Knee Pain    History of Present Illness       Reason for visit:  Knee pain Follow up medcation not helping    He eats 4 or more servings of fruits and vegetables daily.He consumes 0 sweetened beverage(s) daily.He exercises with enough effort to increase his heart rate 10 to 19 minutes per day.  He exercises with enough effort to increase his heart rate 4 days per week.   He is taking medications regularly.        {MA/LPN/RN Pre-Provider Visit Orders- hCG/UA/Strep (Optional):195407}  Follow up knee pain, medication is not affective   {additonal problems for provider to add (Optional):486405}    Left knee pain.   Present 1.5 mo + and seems to be worsening.   Tried meloxicam without improvement.   Swollen intermittent when walk a lot.   Work as   No injury.   No locking   Hurts to bend and walk  Pain lessens when sitting.   Xray completed last visit.   Wearing knee brace.   Given way  Pain is all medial.   Has tried wife's gel for joint pain but didn't think it helped.   Tylenol maybe mild benefit.   Icy hot spray    Mychart works.             {ROS Picklists (Optional):232009}      Objective    /64 (BP Location: Right arm, Patient Position: Sitting, Cuff Size: Adult Regular)   Pulse 59   Temp 97.5  F (36.4  C) (Temporal)   Resp 14   Ht 1.689 m (5' 6.5\")   Wt 80.7 kg (178 lb)   SpO2 98%   BMI 28.30 kg/m    Body mass index is 28.3 kg/m .  Physical Exam   {Exam List (Optional):209050}    {Diagnostic Test Results (Optional):090125}        Signed Electronically by: Nancie Augustin MD  {Email feedback regarding this note to " [email or web address]   :[id number]}

## 2025-04-10 NOTE — LETTER
April 10, 2025      Rolando Granados  [id number]   [place] [id number]        To Whom It May Concern:    Rolando Granados  was seen on 4/10/2025 .  Please excuse him today until 4/11/25 due to injury. He may return to work 4/12/25 without restrictions although may need intermittent time off in the future due to this injury.         Sincerely,        Nancie Augustin MD    Electronically signed

## 2025-04-10 NOTE — PATIENT INSTRUCTIONS
Acetaminophen (tylenol) 1,000 mg three times a day as needed.     Diclofenac gel (voltaren gel)    Ice 20 min at a time 3-4 x's a day.     Schedule MRI knee and physical therapy

## 2025-04-28 NOTE — LETTER
October 15, 2023      To Whom It May Concern:       Rolando Granados was seen in our Emergency Department today, 10/15/23.  Please excuse him from work today.  He may return to work when improved.    Sincerely,        Shayy Saha RN         Male

## 2025-07-31 ENCOUNTER — OFFICE VISIT (OUTPATIENT)
Dept: FAMILY MEDICINE | Facility: CLINIC | Age: 60
End: 2025-07-31
Payer: COMMERCIAL

## 2025-07-31 VITALS
BODY MASS INDEX: 29.01 KG/M2 | DIASTOLIC BLOOD PRESSURE: 72 MMHG | HEART RATE: 57 BPM | WEIGHT: 180.5 LBS | OXYGEN SATURATION: 98 % | HEIGHT: 66 IN | RESPIRATION RATE: 16 BRPM | TEMPERATURE: 97.9 F | SYSTOLIC BLOOD PRESSURE: 132 MMHG

## 2025-07-31 DIAGNOSIS — M25.562 CHRONIC PAIN OF LEFT KNEE: ICD-10-CM

## 2025-07-31 DIAGNOSIS — Z86.73 HISTORY OF ISCHEMIC STROKE: ICD-10-CM

## 2025-07-31 DIAGNOSIS — Z12.11 SCREEN FOR COLON CANCER: ICD-10-CM

## 2025-07-31 DIAGNOSIS — G89.29 CHRONIC PAIN OF LEFT KNEE: ICD-10-CM

## 2025-07-31 DIAGNOSIS — F43.21 SITUATIONAL DEPRESSION: ICD-10-CM

## 2025-07-31 DIAGNOSIS — R73.01 IFG (IMPAIRED FASTING GLUCOSE): ICD-10-CM

## 2025-07-31 DIAGNOSIS — K21.9 GASTROESOPHAGEAL REFLUX DISEASE WITHOUT ESOPHAGITIS: ICD-10-CM

## 2025-07-31 DIAGNOSIS — I10 BENIGN ESSENTIAL HYPERTENSION: Primary | ICD-10-CM

## 2025-07-31 DIAGNOSIS — E78.5 HYPERLIPIDEMIA LDL GOAL <100: ICD-10-CM

## 2025-07-31 LAB
ANION GAP SERPL CALCULATED.3IONS-SCNC: 10 MMOL/L (ref 7–15)
BUN SERPL-MCNC: 8.8 MG/DL (ref 8–23)
CALCIUM SERPL-MCNC: 9.3 MG/DL (ref 8.8–10.4)
CHLORIDE SERPL-SCNC: 99 MMOL/L (ref 98–107)
CREAT SERPL-MCNC: 0.75 MG/DL (ref 0.67–1.17)
EGFRCR SERPLBLD CKD-EPI 2021: >90 ML/MIN/1.73M2
GLUCOSE SERPL-MCNC: 111 MG/DL (ref 70–99)
HCO3 SERPL-SCNC: 26 MMOL/L (ref 22–29)
POTASSIUM SERPL-SCNC: 4 MMOL/L (ref 3.4–5.3)
SODIUM SERPL-SCNC: 135 MMOL/L (ref 135–145)

## 2025-07-31 RX ORDER — CITALOPRAM HYDROBROMIDE 10 MG/1
TABLET ORAL
Qty: 90 TABLET | Refills: 3 | Status: SHIPPED | OUTPATIENT
Start: 2025-07-31

## 2025-07-31 RX ORDER — AMLODIPINE BESYLATE 10 MG/1
10 TABLET ORAL DAILY
Qty: 90 TABLET | Refills: 3 | Status: SHIPPED | OUTPATIENT
Start: 2025-07-31

## 2025-07-31 RX ORDER — LISINOPRIL AND HYDROCHLOROTHIAZIDE 12.5; 2 MG/1; MG/1
2 TABLET ORAL DAILY
Qty: 180 TABLET | Refills: 3 | Status: SHIPPED | OUTPATIENT
Start: 2025-07-31

## 2025-07-31 RX ORDER — OMEPRAZOLE 20 MG/1
20 CAPSULE, DELAYED RELEASE ORAL DAILY
Qty: 90 CAPSULE | Refills: 3 | Status: SHIPPED | OUTPATIENT
Start: 2025-07-31

## 2025-07-31 RX ORDER — ROSUVASTATIN CALCIUM 40 MG/1
40 TABLET, COATED ORAL DAILY
Qty: 90 TABLET | Refills: 3 | Status: SHIPPED | OUTPATIENT
Start: 2025-07-31

## 2025-07-31 RX ORDER — SPIRONOLACTONE 25 MG/1
25 TABLET ORAL DAILY
Qty: 90 TABLET | Refills: 0 | Status: SHIPPED | OUTPATIENT
Start: 2025-07-31

## 2025-07-31 ASSESSMENT — PAIN SCALES - GENERAL: PAINLEVEL_OUTOF10: MODERATE PAIN (5)

## 2025-07-31 NOTE — PROGRESS NOTES
"[facility]  Georges Fox MD, Internal Medicine  Jul 31, 2025      Assessment & Plan     1.  Essential hypertension with blood pressure under control after adding spironolactone.  Patient to continue spironolactone 25 mg a day, losartan HCT 40/25 daily and amlodipine 10 mg a day.  I plan to discontinue clonidine.  Will have BMP checked today.  2.  Left knee pain persistent with x-ray showing no significant arthritis.  Will proceed with MRI of the knee.  I will then get back to him with further recommendation.  Will probably need to be referred to orthopedics.  3.  History of ischemic stroke involving the right pontine in 2019 with left hemiparesis from which she has fully recovered.  4.  Hyperlipidemia been treated with atorvastatin 40 mg a day. Cholesterol 172, HDL 57, LDL 84 measured on 1/21/2025.  5.  Impaired fasting glucose with A1c of 6.3 on 1/25/2025.  6.  Situational depression in remission with citalopram 10 mg a day.   7.  GERD for which she takes omeprazole on a daily basis and controls her symptoms.  8.  Screening for colon cancer.  Discussed colonoscopy and Cologuard.  He opted for colonoscopy.  Orders placed.    I will get back to the results of the MRI of the knee as well as BMP that is being done today.  I have asked him to return for annual physical examination in January 2026.    BMI  Estimated body mass index is 29.58 kg/m  as calculated from the following:    Height as of this encounter: 1.664 m (5' 5.5\").    Weight as of this encounter: 81.9 kg (180 lb 8 oz).         Cecil Marshall is a 60 year old, presenting for the following:  Physical        7/31/2025     8:42 AM   Additional Questions   Roomed by Jackie TOURE   Accompanied by self          Healthy Habits:     Getting at least 3 servings of Calcium per day:  NO    Bi-annual eye exam:  NO    Dental care twice a year:  NO    Sleep apnea or symptoms of sleep apnea:  None    Diet:  Regular (no restrictions)    " Frequency of exercise:  2-3 days/week    Duration of exercise:  15-30 minutes    Taking medications regularly:  Yes    Barriers to taking medications:  None    Medication side effects:  None    Additional concerns today:  No    66-year-old comes in for follow-up of hypertension, hyperlipidemia, previous stroke.  Had  today for this clinic visit.  The patient overall is feeling good.  His only complaint is left knee pain which is worse with ambulation.  He had x-ray earlier this year which actually looked pretty good I expect he has some internal derangement possible in the next couple disease.  Meloxicam that I prescribed did not help.       Advance Care Planning    Discussed advance care planning with patient; however, patient declined at this time.        1/21/2025   General Health   How would you rate your overall physical health? Good   Feel stress (tense, anxious, or unable to sleep) Not at all         1/21/2025   Nutrition   Three or more servings of calcium each day? Yes   Diet: Regular (no restrictions)   How many servings of fruit and vegetables per day? (!) 0-1   How many sweetened beverages each day? 0-1         1/21/2025   Exercise   Days per week of moderate/strenous exercise 2 days   Average minutes spent exercising at this level 30 min         1/21/2025   Social Factors   Frequency of gathering with friends or relatives Once a week   Worry food won't last until get money to buy more No   Food not last or not have enough money for food? No   Do you have housing? (Housing is defined as stable permanent housing and does not include staying outside in a car, in a tent, in an abandoned building, in an overnight shelter, or couch-surfing.) Yes   Are you worried about losing your housing? No   Lack of transportation? No   Unable to get utilities (heat,electricity)? No         1/21/2025   Dental   Dentist two times every year? (!) NO           Today's PHQ-2 Score:       1/21/2025     7:16  AM   PHQ-2 (  [employer])   Q1: Little interest or pleasure in doing things 0    Q2: Feeling down, depressed or hopeless 0    PHQ-2 Score 0    Q1: Little interest or pleasure in doing things Not at all   Q2: Feeling down, depressed or hopeless Not at all   PHQ-2 Score 0       Proxy-reported         2025   Substance Use   Alcohol more than 3/day or more than 7/wk No   Do you use any other substances recreationally? No     Social History     Tobacco Use    Smoking status: Former     Current packs/day: 0.25     Average packs/day: 0.3 packs/day for 20.0 years (5.0 ttl pk-yrs)     Types: Cigarettes     Passive exposure: Past    Smokeless tobacco: Never    Tobacco comments:     5-6 cig per day for 20 years. quit 2019.    Vaping Use    Vaping status: Never Used   Substance Use Topics    Alcohol use: Not Currently    Drug use: Never       Last PSA:   Prostate Specific Antigen Screen   Date Value Ref Range Status   2025 0.77 0.00 - 4.50 ng/mL Final   2021 0.35 0.00 - 4.00 ug/L Final     ASCVD Risk   The ASCVD Risk score (Chan MADDEN, et al., 2019) failed to calculate for the following reasons:    Risk score cannot be calculated because patient has a medical history suggesting prior/existing ASCVD    Fracture Risk Assessment Tool  Link to Frax Calculator  Use the information below to complete the Frax calculator  : 1965  Sex: male  Weight (kg): 81.9 kg (actual weight)  Height (cm): 166.4 cm  Previous Fragility Fracture:  No  History of parent with fractured hip:  No  Current Smoking:  No  Patient has been on glucocorticoids for more than 3 months (5mg/day or more): No  Rheumatoid Arthritis on Problem List:  No  Secondary Osteoporosis on Problem List:  No  Consumes 3 or more units of alcohol per day: No  Femoral Neck BMD (g/cm2)           Reviewed and updated as needed this visit by Provider                    Past Medical History:   Diagnosis Date    Gastroesophageal reflux disease without  esophagitis 01/21/2025    Heart burn 12/01/2020    Hypokalemia 07/30/2024    IFG (impaired fasting glucose) 07/16/2021    RBBB (right bundle branch block) 12/01/2020     Past Surgical History:   Procedure Laterality Date    NO HISTORY OF SURGERY       BP Readings from Last 3 Encounters:   07/31/25 132/72   04/10/25 114/64   02/27/25 119/71    Wt Readings from Last 3 Encounters:   07/31/25 81.9 kg (180 lb 8 oz)   04/10/25 80.7 kg (178 lb)   02/27/25 80.1 kg (176 lb 8 oz)                  Patient Active Problem List   Diagnosis    History of ischemic stroke    Situational depression    Hyperlipidemia LDL goal <100    Benign essential hypertension    Heart burn    RBBB (right bundle branch block)    Hypertensive retinopathy of both eyes    Presbyopia    Hyperopia of both eyes    Stationary peripheral pterygium of both eyes    Cortical senile cataract of both eyes    Dry eyes    IFG (impaired fasting glucose)    Left hemiplegia (H)    Hypokalemia    Gastroesophageal reflux disease without esophagitis     Past Surgical History:   Procedure Laterality Date    NO HISTORY OF SURGERY         Social History     Tobacco Use    Smoking status: Former     Current packs/day: 0.25     Average packs/day: 0.3 packs/day for 20.0 years (5.0 ttl pk-yrs)     Types: Cigarettes     Passive exposure: Past    Smokeless tobacco: Never    Tobacco comments:     5-6 cig per day for 20 years. quit 2019.    Substance Use Topics    Alcohol use: Not Currently     Family History   Problem Relation Age of Onset    Diabetes Sister          Current Outpatient Medications   Medication Sig Dispense Refill    acetaminophen (TYLENOL) 325 MG tablet Take 1-2 tablets (325-650 mg) by mouth every 6 hours as needed for mild pain. 100 tablet 4    amLODIPine (NORVASC) 10 MG tablet Take 1 tablet (10 mg) by mouth daily 90 tablet 3    aspirin 81 MG EC tablet Take 1 tablet (81 mg) by mouth daily. 90 tablet 3    citalopram (CELEXA) 10 MG tablet TAKE 1 TABLET BY MOUTH  "EVERY DAY. WEAN OFF AS ABLE. MAY RESUME IF SYMPTOMS RETURN. 90 tablet 3    cloNIDine (CATAPRES) 0.1 MG tablet Take 1 tablet (0.1 mg) by mouth 2 times daily 180 tablet 3    lisinopril-hydrochlorothiazide (ZESTORETIC) 20-12.5 MG tablet Take 2 tablets by mouth daily 180 tablet 3    meloxicam (MOBIC) 15 MG tablet Take 1 tablet (15 mg) by mouth daily for 15 days. 15 tablet 0    omeprazole (PRILOSEC) 20 MG DR capsule Take 1 capsule (20 mg) by mouth daily 90 capsule 3    rosuvastatin (CRESTOR) 40 MG tablet Take 1 tablet (40 mg) by mouth daily 90 tablet 3    senna-docusate (SENOKOT-S/PERICOLACE) 8.6-50 MG tablet Take 1-2 tablets by mouth daily as needed for constipation. 100 tablet 3    spironolactone (ALDACTONE) 25 MG tablet Take 1 tablet (25 mg) by mouth daily. 90 tablet 0     No Known Allergies      Review of Systems  Constitutional, HEENT, cardiovascular, pulmonary, GI, , musculoskeletal, neuro, skin, endocrine and psych systems are negative, except as otherwise noted.     Objective    Exam  /72 (BP Location: Right arm, Patient Position: Sitting, Cuff Size: Adult Large)   Pulse 57   Temp 97.9  F (36.6  C) (Oral)   Resp 16   Ht 1.664 m (5' 5.5\")   Wt 81.9 kg (180 lb 8 oz)   SpO2 98%   BMI 29.58 kg/m     Estimated body mass index is 29.58 kg/m  as calculated from the following:    Height as of this encounter: 1.664 m (5' 5.5\").    Weight as of this encounter: 81.9 kg (180 lb 8 oz).    Physical Exam  GENERAL: alert and no distress  NECK: no adenopathy, no asymmetry, masses, or scars  RESP: lungs clear to auscultation - no rales, rhonchi or wheezes  CV: regular rate and rhythm, normal S1 S2, no S3 or S4, no murmur, click or rub, no peripheral edema  ABDOMEN: soft, nontender, no hepatosplenomegaly, no masses and bowel sounds normal  MS: Left knee reveals no redness or swelling.  Some tenderness on palpation of the medial joint space.  Passive range of motion both flexion and extension is good.        Signed " Electronically by: Georges Fox MD